# Patient Record
Sex: FEMALE | Race: WHITE | NOT HISPANIC OR LATINO | Employment: OTHER | URBAN - METROPOLITAN AREA
[De-identification: names, ages, dates, MRNs, and addresses within clinical notes are randomized per-mention and may not be internally consistent; named-entity substitution may affect disease eponyms.]

---

## 2019-04-08 ENCOUNTER — HOSPITAL ENCOUNTER (OUTPATIENT)
Dept: OTHER | Facility: HOSPITAL | Age: 79
Discharge: HOME | End: 2019-04-08
Attending: INTERNAL MEDICINE

## 2019-04-08 LAB
25(OH)D3 SERPL-MCNC: 31.2 NG/ML (ref 30–100)
ALBUMIN SERPL-MCNC: 4.5 G/DL (ref 3.5–5)
ALBUMIN/GLOB SERPL: 1.6 {RATIO} (ref 1.4–2.6)
ALP SERPL-CCNC: 60 U/L (ref 43–160)
ALT SERPL-CCNC: 14 U/L (ref 10–40)
ANION GAP SERPL CALC-SCNC: 19 MMOL/L (ref 8–19)
AST SERPL-CCNC: 23 U/L (ref 15–50)
BILIRUB SERPL-MCNC: 0.61 MG/DL (ref 0.2–1.3)
BUN SERPL-MCNC: 13 MG/DL (ref 5–25)
BUN/CREAT SERPL: 14 {RATIO} (ref 6–20)
CALCIUM SERPL-MCNC: 9.5 MG/DL (ref 8.7–10.4)
CHLORIDE SERPL-SCNC: 102 MMOL/L (ref 99–111)
CHOLEST SERPL-MCNC: 112 MG/DL (ref 107–200)
CHOLEST/HDLC SERPL: 3.1 {RATIO} (ref 3–6)
CONV CO2: 24 MMOL/L (ref 22–32)
CONV CREATININE URINE, RANDOM: 73.7 MG/DL (ref 10–300)
CONV MICROALBUM.,U,RANDOM: 108.5 MG/L (ref 0–20)
CONV TOTAL PROTEIN: 7.4 G/DL (ref 6.3–8.2)
CREAT UR-MCNC: 0.96 MG/DL (ref 0.5–0.9)
EST. AVERAGE GLUCOSE BLD GHB EST-MCNC: 169 MG/DL
GFR SERPLBLD BASED ON 1.73 SQ M-ARVRAT: 57 ML/MIN/{1.73_M2}
GLOBULIN UR ELPH-MCNC: 2.9 G/DL (ref 2–3.5)
GLUCOSE SERPL-MCNC: 143 MG/DL (ref 65–99)
HBA1C MFR BLD: 7.5 % (ref 3.5–5.7)
HDLC SERPL-MCNC: 36 MG/DL (ref 40–60)
LDLC SERPL CALC-MCNC: 29 MG/DL (ref 70–100)
MICROALBUMIN/CREAT UR: 147.2 MG/G{CRE} (ref 0–35)
OSMOLALITY SERPL CALC.SUM OF ELEC: 293 MOSM/KG (ref 273–304)
POTASSIUM SERPL-SCNC: 5 MMOL/L (ref 3.5–5.3)
SODIUM SERPL-SCNC: 140 MMOL/L (ref 135–147)
TRIGL SERPL-MCNC: 237 MG/DL (ref 40–150)
TSH SERPL-ACNC: 3.68 M[IU]/L (ref 0.27–4.2)
VLDLC SERPL-MCNC: 47 MG/DL (ref 5–37)

## 2019-10-03 ENCOUNTER — HOSPITAL ENCOUNTER (OUTPATIENT)
Dept: OTHER | Facility: HOSPITAL | Age: 79
Discharge: HOME | End: 2019-10-03
Attending: INTERNAL MEDICINE

## 2019-10-03 LAB
25(OH)D3 SERPL-MCNC: 27.7 NG/ML (ref 30–100)
ALBUMIN SERPL-MCNC: 4.4 G/DL (ref 3.5–5)
ALBUMIN/GLOB SERPL: 1.5 {RATIO} (ref 1.4–2.6)
ALP SERPL-CCNC: 57 U/L (ref 43–160)
ALT SERPL-CCNC: 12 U/L (ref 10–40)
ANION GAP SERPL CALC-SCNC: 17 MMOL/L (ref 8–19)
AST SERPL-CCNC: 15 U/L (ref 15–50)
BASOPHILS # BLD AUTO: 0.03 10*3/UL (ref 0–0.2)
BASOPHILS NFR BLD AUTO: 0.6 % (ref 0–3)
BILIRUB SERPL-MCNC: 0.53 MG/DL (ref 0.2–1.3)
BUN SERPL-MCNC: 16 MG/DL (ref 5–25)
BUN/CREAT SERPL: 15 {RATIO} (ref 6–20)
CALCIUM SERPL-MCNC: 9.4 MG/DL (ref 8.7–10.4)
CHLORIDE SERPL-SCNC: 105 MMOL/L (ref 99–111)
CHOLEST SERPL-MCNC: 112 MG/DL (ref 107–200)
CHOLEST/HDLC SERPL: 3.2 {RATIO} (ref 3–6)
CONV ABS IMM GRAN: 0.02 10*3/UL (ref 0–0.2)
CONV CO2: 24 MMOL/L (ref 22–32)
CONV CREATININE URINE, RANDOM: 49.6 MG/DL (ref 10–300)
CONV IMMATURE GRAN: 0.4 % (ref 0–1.8)
CONV MICROALBUM.,U,RANDOM: 51.3 MG/L (ref 0–20)
CONV TOTAL PROTEIN: 7.3 G/DL (ref 6.3–8.2)
CREAT UR-MCNC: 1.07 MG/DL (ref 0.5–0.9)
DEPRECATED RDW RBC AUTO: 45.4 FL (ref 36.4–46.3)
EOSINOPHIL # BLD AUTO: 0.32 10*3/UL (ref 0–0.7)
EOSINOPHIL # BLD AUTO: 6 % (ref 0–7)
ERYTHROCYTE [DISTWIDTH] IN BLOOD BY AUTOMATED COUNT: 14.1 % (ref 11.7–14.4)
EST. AVERAGE GLUCOSE BLD GHB EST-MCNC: 166 MG/DL
GFR SERPLBLD BASED ON 1.73 SQ M-ARVRAT: 50 ML/MIN/{1.73_M2}
GLOBULIN UR ELPH-MCNC: 2.9 G/DL (ref 2–3.5)
GLUCOSE SERPL-MCNC: 110 MG/DL (ref 65–99)
HBA1C MFR BLD: 7.4 % (ref 3.5–5.7)
HCT VFR BLD AUTO: 37.1 % (ref 37–47)
HDLC SERPL-MCNC: 35 MG/DL (ref 40–60)
HGB BLD-MCNC: 11.4 G/DL (ref 12–16)
IRON SATN MFR SERPL: 15 % (ref 20–55)
IRON SERPL-MCNC: 71 UG/DL (ref 60–170)
LDLC SERPL CALC-MCNC: 36 MG/DL (ref 70–100)
LYMPHOCYTES # BLD AUTO: 1.51 10*3/UL (ref 1–5)
LYMPHOCYTES NFR BLD AUTO: 28.2 % (ref 20–45)
MCH RBC QN AUTO: 27.2 PG (ref 27–31)
MCHC RBC AUTO-ENTMCNC: 30.7 G/DL (ref 33–37)
MCV RBC AUTO: 88.5 FL (ref 81–99)
MICROALBUMIN/CREAT UR: 103.4 MG/G{CRE} (ref 0–35)
MONOCYTES # BLD AUTO: 0.45 10*3/UL (ref 0.2–1.2)
MONOCYTES NFR BLD AUTO: 8.4 % (ref 3–10)
NEUTROPHILS # BLD AUTO: 3.02 10*3/UL (ref 2–8)
NEUTROPHILS NFR BLD AUTO: 56.4 % (ref 30–85)
NRBC CBCN: 0 % (ref 0–0.7)
OSMOLALITY SERPL CALC.SUM OF ELEC: 294 MOSM/KG (ref 273–304)
PLATELET # BLD AUTO: 237 10*3/UL (ref 130–400)
PMV BLD AUTO: 10.7 FL (ref 9.4–12.3)
POTASSIUM SERPL-SCNC: 4.8 MMOL/L (ref 3.5–5.3)
RBC # BLD AUTO: 4.19 10*6/UL (ref 4.2–5.4)
SODIUM SERPL-SCNC: 141 MMOL/L (ref 135–147)
TIBC SERPL-MCNC: 468 UG/DL (ref 245–450)
TRANSFERRIN SERPL-MCNC: 327 MG/DL (ref 250–380)
TRIGL SERPL-MCNC: 207 MG/DL (ref 40–150)
VIT B12 SERPL-MCNC: 848 PG/ML (ref 211–911)
VLDLC SERPL-MCNC: 41 MG/DL (ref 5–37)
WBC # BLD AUTO: 5.35 10*3/UL (ref 4.8–10.8)

## 2020-04-17 ENCOUNTER — HOSPITAL ENCOUNTER (OUTPATIENT)
Dept: LAB | Facility: HOSPITAL | Age: 80
Discharge: HOME | End: 2020-04-17
Attending: INTERNAL MEDICINE

## 2020-04-17 LAB
ALBUMIN SERPL-MCNC: 4.4 G/DL (ref 3.5–5)
ALBUMIN/GLOB SERPL: 1.5 {RATIO} (ref 1.4–2.6)
ALP SERPL-CCNC: 62 U/L (ref 43–160)
ALT SERPL-CCNC: 16 U/L (ref 10–40)
ANION GAP SERPL CALC-SCNC: 18 MMOL/L (ref 8–19)
AST SERPL-CCNC: 18 U/L (ref 15–50)
BASOPHILS # BLD AUTO: 0.04 10*3/UL (ref 0–0.2)
BASOPHILS NFR BLD AUTO: 0.7 % (ref 0–3)
BILIRUB SERPL-MCNC: 0.47 MG/DL (ref 0.2–1.3)
BUN SERPL-MCNC: 11 MG/DL (ref 5–25)
BUN/CREAT SERPL: 13 {RATIO} (ref 6–20)
CALCIUM SERPL-MCNC: 9.5 MG/DL (ref 8.7–10.4)
CHLORIDE SERPL-SCNC: 103 MMOL/L (ref 99–111)
CHOLEST SERPL-MCNC: 109 MG/DL (ref 107–200)
CHOLEST/HDLC SERPL: 3 {RATIO} (ref 3–6)
CONV ABS IMM GRAN: 0.01 10*3/UL (ref 0–0.2)
CONV CO2: 25 MMOL/L (ref 22–32)
CONV IMMATURE GRAN: 0.2 % (ref 0–1.8)
CONV TOTAL PROTEIN: 7.4 G/DL (ref 6.3–8.2)
CREAT UR-MCNC: 0.88 MG/DL (ref 0.5–0.9)
DEPRECATED RDW RBC AUTO: 46.4 FL (ref 36.4–46.3)
EOSINOPHIL # BLD AUTO: 0.35 10*3/UL (ref 0–0.7)
EOSINOPHIL # BLD AUTO: 6.6 % (ref 0–7)
ERYTHROCYTE [DISTWIDTH] IN BLOOD BY AUTOMATED COUNT: 14.2 % (ref 11.7–14.4)
EST. AVERAGE GLUCOSE BLD GHB EST-MCNC: 169 MG/DL
FERRITIN SERPL-MCNC: 20 NG/ML (ref 10–200)
GFR SERPLBLD BASED ON 1.73 SQ M-ARVRAT: >60 ML/MIN/{1.73_M2}
GLOBULIN UR ELPH-MCNC: 3 G/DL (ref 2–3.5)
GLUCOSE SERPL-MCNC: 136 MG/DL (ref 65–99)
HBA1C MFR BLD: 7.5 % (ref 3.5–5.7)
HCT VFR BLD AUTO: 38 % (ref 37–47)
HDLC SERPL-MCNC: 36 MG/DL (ref 40–60)
HGB BLD-MCNC: 11.8 G/DL (ref 12–16)
IRON SATN MFR SERPL: 13 % (ref 20–55)
IRON SERPL-MCNC: 58 UG/DL (ref 60–170)
LDLC SERPL CALC-MCNC: 34 MG/DL (ref 70–100)
LYMPHOCYTES # BLD AUTO: 1.59 10*3/UL (ref 1–5)
LYMPHOCYTES NFR BLD AUTO: 29.8 % (ref 20–45)
MCH RBC QN AUTO: 27.9 PG (ref 27–31)
MCHC RBC AUTO-ENTMCNC: 31.1 G/DL (ref 33–37)
MCV RBC AUTO: 89.8 FL (ref 81–99)
MONOCYTES # BLD AUTO: 0.49 10*3/UL (ref 0.2–1.2)
MONOCYTES NFR BLD AUTO: 9.2 % (ref 3–10)
NEUTROPHILS # BLD AUTO: 2.86 10*3/UL (ref 2–8)
NEUTROPHILS NFR BLD AUTO: 53.5 % (ref 30–85)
NRBC CBCN: 0 % (ref 0–0.7)
OSMOLALITY SERPL CALC.SUM OF ELEC: 293 MOSM/KG (ref 273–304)
PLATELET # BLD AUTO: 293 10*3/UL (ref 130–400)
PMV BLD AUTO: 10.9 FL (ref 9.4–12.3)
POTASSIUM SERPL-SCNC: 4.9 MMOL/L (ref 3.5–5.3)
RBC # BLD AUTO: 4.23 10*6/UL (ref 4.2–5.4)
SODIUM SERPL-SCNC: 141 MMOL/L (ref 135–147)
TIBC SERPL-MCNC: 459 UG/DL (ref 245–450)
TRANSFERRIN SERPL-MCNC: 321 MG/DL (ref 250–380)
TRIGL SERPL-MCNC: 197 MG/DL (ref 40–150)
VLDLC SERPL-MCNC: 39 MG/DL (ref 5–37)
WBC # BLD AUTO: 5.34 10*3/UL (ref 4.8–10.8)

## 2020-05-22 ENCOUNTER — HOSPITAL ENCOUNTER (OUTPATIENT)
Dept: GENERAL RADIOLOGY | Facility: HOSPITAL | Age: 80
Discharge: HOME | End: 2020-05-22
Attending: INTERNAL MEDICINE

## 2020-06-01 ENCOUNTER — OFFICE VISIT CONVERTED (OUTPATIENT)
Dept: ORTHOPEDIC SURGERY | Facility: CLINIC | Age: 80
End: 2020-06-01
Attending: ORTHOPAEDIC SURGERY

## 2020-06-01 ENCOUNTER — CONVERSION ENCOUNTER (OUTPATIENT)
Dept: ORTHOPEDIC SURGERY | Facility: CLINIC | Age: 80
End: 2020-06-01

## 2020-07-15 ENCOUNTER — HOSPITAL ENCOUNTER (OUTPATIENT)
Dept: LAB | Facility: HOSPITAL | Age: 80
Discharge: HOME | End: 2020-07-15
Attending: INTERNAL MEDICINE

## 2020-07-15 LAB
25(OH)D3 SERPL-MCNC: 36.2 NG/ML (ref 30–100)
ALBUMIN SERPL-MCNC: 4.5 G/DL (ref 3.5–5)
ALBUMIN/GLOB SERPL: 1.5 {RATIO} (ref 1.4–2.6)
ALP SERPL-CCNC: 62 U/L (ref 43–160)
ALT SERPL-CCNC: 20 U/L (ref 10–40)
ANION GAP SERPL CALC-SCNC: 21 MMOL/L (ref 8–19)
AST SERPL-CCNC: 23 U/L (ref 15–50)
BASOPHILS # BLD AUTO: 0.03 10*3/UL (ref 0–0.2)
BASOPHILS NFR BLD AUTO: 0.5 % (ref 0–3)
BILIRUB SERPL-MCNC: 0.64 MG/DL (ref 0.2–1.3)
BUN SERPL-MCNC: 11 MG/DL (ref 5–25)
BUN/CREAT SERPL: 10 {RATIO} (ref 6–20)
CALCIUM SERPL-MCNC: 9.7 MG/DL (ref 8.7–10.4)
CHLORIDE SERPL-SCNC: 104 MMOL/L (ref 99–111)
CHOLEST SERPL-MCNC: 123 MG/DL (ref 107–200)
CHOLEST/HDLC SERPL: 3.1 {RATIO} (ref 3–6)
CONV ABS IMM GRAN: 0.02 10*3/UL (ref 0–0.2)
CONV CO2: 22 MMOL/L (ref 22–32)
CONV CREATININE URINE, RANDOM: 57.6 MG/DL (ref 10–300)
CONV IMMATURE GRAN: 0.3 % (ref 0–1.8)
CONV MICROALBUM.,U,RANDOM: 67.6 MG/L (ref 0–20)
CONV TOTAL PROTEIN: 7.5 G/DL (ref 6.3–8.2)
CREAT UR-MCNC: 1.11 MG/DL (ref 0.5–0.9)
DEPRECATED RDW RBC AUTO: 48.6 FL (ref 36.4–46.3)
EOSINOPHIL # BLD AUTO: 0.41 10*3/UL (ref 0–0.7)
EOSINOPHIL # BLD AUTO: 7 % (ref 0–7)
ERYTHROCYTE [DISTWIDTH] IN BLOOD BY AUTOMATED COUNT: 14.5 % (ref 11.7–14.4)
EST. AVERAGE GLUCOSE BLD GHB EST-MCNC: 174 MG/DL
GFR SERPLBLD BASED ON 1.73 SQ M-ARVRAT: 47 ML/MIN/{1.73_M2}
GLOBULIN UR ELPH-MCNC: 3 G/DL (ref 2–3.5)
GLUCOSE SERPL-MCNC: 137 MG/DL (ref 65–99)
HBA1C MFR BLD: 7.7 % (ref 3.5–5.7)
HCT VFR BLD AUTO: 36.7 % (ref 37–47)
HDLC SERPL-MCNC: 40 MG/DL (ref 40–60)
HGB BLD-MCNC: 11.3 G/DL (ref 12–16)
IRON SERPL-MCNC: 74 UG/DL (ref 60–170)
LDLC SERPL CALC-MCNC: 24 MG/DL (ref 70–100)
LYMPHOCYTES # BLD AUTO: 1.5 10*3/UL (ref 1–5)
LYMPHOCYTES NFR BLD AUTO: 25.6 % (ref 20–45)
MCH RBC QN AUTO: 28 PG (ref 27–31)
MCHC RBC AUTO-ENTMCNC: 30.8 G/DL (ref 33–37)
MCV RBC AUTO: 90.8 FL (ref 81–99)
MICROALBUMIN/CREAT UR: 117.4 MG/G{CRE} (ref 0–35)
MONOCYTES # BLD AUTO: 0.44 10*3/UL (ref 0.2–1.2)
MONOCYTES NFR BLD AUTO: 7.5 % (ref 3–10)
NEUTROPHILS # BLD AUTO: 3.46 10*3/UL (ref 2–8)
NEUTROPHILS NFR BLD AUTO: 59.1 % (ref 30–85)
NRBC CBCN: 0 % (ref 0–0.7)
OSMOLALITY SERPL CALC.SUM OF ELEC: 296 MOSM/KG (ref 273–304)
PLATELET # BLD AUTO: 243 10*3/UL (ref 130–400)
PMV BLD AUTO: 11.5 FL (ref 9.4–12.3)
POTASSIUM SERPL-SCNC: 4.8 MMOL/L (ref 3.5–5.3)
RBC # BLD AUTO: 4.04 10*6/UL (ref 4.2–5.4)
SODIUM SERPL-SCNC: 142 MMOL/L (ref 135–147)
TRIGL SERPL-MCNC: 295 MG/DL (ref 40–150)
TSH SERPL-ACNC: 4.03 M[IU]/L (ref 0.27–4.2)
VIT B12 SERPL-MCNC: 986 PG/ML (ref 211–911)
VLDLC SERPL-MCNC: 59 MG/DL (ref 5–37)
WBC # BLD AUTO: 5.86 10*3/UL (ref 4.8–10.8)

## 2020-07-23 ENCOUNTER — HOSPITAL ENCOUNTER (OUTPATIENT)
Dept: GENERAL RADIOLOGY | Facility: HOSPITAL | Age: 80
Discharge: HOME | End: 2020-07-23
Attending: INTERNAL MEDICINE

## 2020-10-12 ENCOUNTER — HOSPITAL ENCOUNTER (OUTPATIENT)
Dept: LAB | Facility: HOSPITAL | Age: 80
Discharge: HOME | End: 2020-10-12
Attending: INTERNAL MEDICINE

## 2020-10-12 LAB
25(OH)D3 SERPL-MCNC: 29.8 NG/ML (ref 30–100)
ALBUMIN SERPL-MCNC: 4.3 G/DL (ref 3.5–5)
ALBUMIN/GLOB SERPL: 1.5 {RATIO} (ref 1.4–2.6)
ALP SERPL-CCNC: 62 U/L (ref 43–160)
ALT SERPL-CCNC: 14 U/L (ref 10–40)
ANION GAP SERPL CALC-SCNC: 21 MMOL/L (ref 8–19)
AST SERPL-CCNC: 22 U/L (ref 15–50)
BASOPHILS # BLD AUTO: 0.02 10*3/UL (ref 0–0.2)
BASOPHILS NFR BLD AUTO: 0.4 % (ref 0–3)
BILIRUB SERPL-MCNC: 0.61 MG/DL (ref 0.2–1.3)
BUN SERPL-MCNC: 15 MG/DL (ref 5–25)
BUN/CREAT SERPL: 12 {RATIO} (ref 6–20)
CALCIUM SERPL-MCNC: 10.4 MG/DL (ref 8.7–10.4)
CHLORIDE SERPL-SCNC: 104 MMOL/L (ref 99–111)
CHOLEST SERPL-MCNC: 146 MG/DL (ref 107–200)
CHOLEST/HDLC SERPL: 4.1 {RATIO} (ref 3–6)
CONV ABS IMM GRAN: 0.01 10*3/UL (ref 0–0.2)
CONV CO2: 21 MMOL/L (ref 22–32)
CONV CREATININE URINE, RANDOM: 22.9 MG/DL (ref 10–300)
CONV IMMATURE GRAN: 0.2 % (ref 0–1.8)
CONV MICROALBUM.,U,RANDOM: 41.6 MG/L (ref 0–20)
CONV TOTAL PROTEIN: 7.2 G/DL (ref 6.3–8.2)
CREAT UR-MCNC: 1.27 MG/DL (ref 0.5–0.9)
DEPRECATED RDW RBC AUTO: 44 FL (ref 36.4–46.3)
EOSINOPHIL # BLD AUTO: 0.39 10*3/UL (ref 0–0.7)
EOSINOPHIL # BLD AUTO: 8 % (ref 0–7)
ERYTHROCYTE [DISTWIDTH] IN BLOOD BY AUTOMATED COUNT: 13.4 % (ref 11.7–14.4)
EST. AVERAGE GLUCOSE BLD GHB EST-MCNC: 163 MG/DL
FERRITIN SERPL-MCNC: 27 NG/ML (ref 10–200)
FOLATE SERPL-MCNC: >20 NG/ML (ref 4.8–20)
GFR SERPLBLD BASED ON 1.73 SQ M-ARVRAT: 40 ML/MIN/{1.73_M2}
GLOBULIN UR ELPH-MCNC: 2.9 G/DL (ref 2–3.5)
GLUCOSE SERPL-MCNC: 112 MG/DL (ref 65–99)
HBA1C MFR BLD: 7.3 % (ref 3.5–5.7)
HCT VFR BLD AUTO: 37 % (ref 37–47)
HDLC SERPL-MCNC: 36 MG/DL (ref 40–60)
HGB BLD-MCNC: 11.4 G/DL (ref 12–16)
IRON SATN MFR SERPL: 14 % (ref 20–55)
IRON SERPL-MCNC: 64 UG/DL (ref 60–170)
LDLC SERPL CALC-MCNC: 60 MG/DL (ref 70–100)
LYMPHOCYTES # BLD AUTO: 1.38 10*3/UL (ref 1–5)
LYMPHOCYTES NFR BLD AUTO: 28.5 % (ref 20–45)
MCH RBC QN AUTO: 27.7 PG (ref 27–31)
MCHC RBC AUTO-ENTMCNC: 30.8 G/DL (ref 33–37)
MCV RBC AUTO: 90 FL (ref 81–99)
MICROALBUMIN/CREAT UR: 181.7 MG/G{CRE} (ref 0–35)
MONOCYTES # BLD AUTO: 0.42 10*3/UL (ref 0.2–1.2)
MONOCYTES NFR BLD AUTO: 8.7 % (ref 3–10)
NEUTROPHILS # BLD AUTO: 2.63 10*3/UL (ref 2–8)
NEUTROPHILS NFR BLD AUTO: 54.2 % (ref 30–85)
NRBC CBCN: 0 % (ref 0–0.7)
OSMOLALITY SERPL CALC.SUM OF ELEC: 294 MOSM/KG (ref 273–304)
PLATELET # BLD AUTO: 242 10*3/UL (ref 130–400)
PMV BLD AUTO: 10.9 FL (ref 9.4–12.3)
POTASSIUM SERPL-SCNC: 5.1 MMOL/L (ref 3.5–5.3)
RBC # BLD AUTO: 4.11 10*6/UL (ref 4.2–5.4)
SODIUM SERPL-SCNC: 141 MMOL/L (ref 135–147)
T4 FREE SERPL-MCNC: 1.1 NG/DL (ref 0.9–1.8)
TIBC SERPL-MCNC: 462 UG/DL (ref 245–450)
TRANSFERRIN SERPL-MCNC: 323 MG/DL (ref 250–380)
TRIGL SERPL-MCNC: 252 MG/DL (ref 40–150)
TSH SERPL-ACNC: 3.75 M[IU]/L (ref 0.27–4.2)
VIT B12 SERPL-MCNC: 242 PG/ML (ref 211–911)
VLDLC SERPL-MCNC: 50 MG/DL (ref 5–37)
WBC # BLD AUTO: 4.85 10*3/UL (ref 4.8–10.8)

## 2020-10-13 LAB
ALBUMIN SERPL-MCNC: 3.5 G/DL (ref 2.9–4.4)
ALBUMIN/GLOB SERPL: 1 {RATIO} (ref 0.7–1.7)
ALPHA2 GLOB SERPL ELPH-MCNC: 1.2 G/DL (ref 0.4–1)
BETA GLOBULIN: 1.1 G/DL (ref 0.7–1.3)
C PEPTIDE SERPL-MCNC: 5.3 NG/ML (ref 1.1–4.4)
CONV ALPHA-1-GLOBULIN: 0.2 G/DL (ref 0–0.4)
CONV PE INTERPRETATION: ABNORMAL
CONV PE NOTE: ABNORMAL
CONV TOTAL PROTEIN: 6.9 G/DL (ref 6–8.5)
GAMMA GLOB SERPL ELPH-MCNC: 0.9 G/DL (ref 0.4–1.8)
GLOBULIN UR ELPH-MCNC: 3.4 G/DL (ref 2.2–3.9)
M-SPIKE: ABNORMAL G/DL

## 2020-10-15 LAB
ALBUMIN 24H MFR UR ELPH: 28.2 %
ALPHA1 GLOB 24H MFR UR ELPH: 11.2 %
ALPHA2 GLOB 24H MFR UR ELPH: 25.6 %
B-GLOBULIN MFR UR ELPH: 24.1 %
CONV IMMUNOFIXATION (URINE): NORMAL
CONV PE NOTE: NORMAL
GAMMA GLOB 24H MFR UR ELPH: 10.9 %
M PROTEIN MFR UR ELPH: NORMAL %
PROT UR-MCNC: 13.6 MG/DL

## 2020-10-27 ENCOUNTER — HOSPITAL ENCOUNTER (OUTPATIENT)
Dept: LAB | Facility: HOSPITAL | Age: 80
Discharge: HOME | End: 2020-10-27
Attending: INTERNAL MEDICINE

## 2020-10-27 LAB
APPEARANCE UR: CLEAR
BILIRUB UR QL: NEGATIVE
COLOR UR: YELLOW
CONV BACTERIA: NEGATIVE
CONV COLLECTION SOURCE (UA): ABNORMAL
CONV HYALINE CASTS IN URINE MICRO: ABNORMAL /[LPF]
CONV UROBILINOGEN IN URINE BY AUTOMATED TEST STRIP: 0.2 {EHRLICHU}/DL (ref 0.1–1)
GLUCOSE UR QL: >=1000 MG/DL
HGB UR QL STRIP: ABNORMAL
KETONES UR QL STRIP: NEGATIVE MG/DL
LEUKOCYTE ESTERASE UR QL STRIP: NEGATIVE
NITRITE UR QL STRIP: NEGATIVE
PH UR STRIP.AUTO: 5.5 [PH] (ref 5–8)
PROT UR QL: 100 MG/DL
RBC #/AREA URNS HPF: ABNORMAL /[HPF]
SP GR UR: 1.02 (ref 1–1.03)
WBC #/AREA URNS HPF: ABNORMAL /[HPF]

## 2020-11-10 ENCOUNTER — HOSPITAL ENCOUNTER (OUTPATIENT)
Dept: MAMMOGRAPHY | Facility: HOSPITAL | Age: 80
Discharge: HOME | End: 2020-11-10
Attending: INTERNAL MEDICINE

## 2021-04-22 ENCOUNTER — HOSPITAL ENCOUNTER (OUTPATIENT)
Dept: LAB | Facility: HOSPITAL | Age: 81
Discharge: HOME | End: 2021-04-22
Attending: INTERNAL MEDICINE

## 2021-04-22 LAB
ALBUMIN SERPL-MCNC: 4 G/DL (ref 3.5–5)
ALBUMIN/GLOB SERPL: 1.3 {RATIO} (ref 1.4–2.6)
ALP SERPL-CCNC: 58 U/L (ref 43–160)
ALT SERPL-CCNC: 14 U/L (ref 10–40)
ANION GAP SERPL CALC-SCNC: 14 MMOL/L (ref 8–19)
AST SERPL-CCNC: 19 U/L (ref 15–50)
BASOPHILS # BLD AUTO: 0.03 10*3/UL (ref 0–0.2)
BASOPHILS NFR BLD AUTO: 0.6 % (ref 0–3)
BILIRUB SERPL-MCNC: 0.49 MG/DL (ref 0.2–1.3)
BUN SERPL-MCNC: 10 MG/DL (ref 5–25)
BUN/CREAT SERPL: 9 {RATIO} (ref 6–20)
CALCIUM SERPL-MCNC: 9.1 MG/DL (ref 8.7–10.4)
CHLORIDE SERPL-SCNC: 104 MMOL/L (ref 99–111)
CHOLEST SERPL-MCNC: 132 MG/DL (ref 107–200)
CHOLEST/HDLC SERPL: 3.2 {RATIO} (ref 3–6)
CONV ABS IMM GRAN: 0.01 10*3/UL (ref 0–0.2)
CONV CO2: 26 MMOL/L (ref 22–32)
CONV IMMATURE GRAN: 0.2 % (ref 0–1.8)
CONV TOTAL PROTEIN: 7.2 G/DL (ref 6.3–8.2)
CREAT UR-MCNC: 1.06 MG/DL (ref 0.5–0.9)
DEPRECATED RDW RBC AUTO: 45.7 FL (ref 36.4–46.3)
EOSINOPHIL # BLD AUTO: 0.26 10*3/UL (ref 0–0.7)
EOSINOPHIL # BLD AUTO: 5.4 % (ref 0–7)
ERYTHROCYTE [DISTWIDTH] IN BLOOD BY AUTOMATED COUNT: 14 % (ref 11.7–14.4)
EST. AVERAGE GLUCOSE BLD GHB EST-MCNC: 163 MG/DL
FERRITIN SERPL-MCNC: 29 NG/ML (ref 10–200)
FOLATE SERPL-MCNC: 18.2 NG/ML (ref 4.8–20)
GFR SERPLBLD BASED ON 1.73 SQ M-ARVRAT: 50 ML/MIN/{1.73_M2}
GLOBULIN UR ELPH-MCNC: 3.2 G/DL (ref 2–3.5)
GLUCOSE SERPL-MCNC: 147 MG/DL (ref 65–99)
HBA1C MFR BLD: 7.3 % (ref 3.5–5.7)
HCT VFR BLD AUTO: 35.6 % (ref 37–47)
HDLC SERPL-MCNC: 41 MG/DL (ref 40–60)
HGB BLD-MCNC: 11.3 G/DL (ref 12–16)
IRON SATN MFR SERPL: 16 % (ref 20–55)
IRON SERPL-MCNC: 69 UG/DL (ref 60–170)
LDLC SERPL CALC-MCNC: 54 MG/DL (ref 70–100)
LYMPHOCYTES # BLD AUTO: 1.4 10*3/UL (ref 1–5)
LYMPHOCYTES NFR BLD AUTO: 29.3 % (ref 20–45)
MCH RBC QN AUTO: 28.3 PG (ref 27–31)
MCHC RBC AUTO-ENTMCNC: 31.7 G/DL (ref 33–37)
MCV RBC AUTO: 89.2 FL (ref 81–99)
MONOCYTES # BLD AUTO: 0.42 10*3/UL (ref 0.2–1.2)
MONOCYTES NFR BLD AUTO: 8.8 % (ref 3–10)
NEUTROPHILS # BLD AUTO: 2.66 10*3/UL (ref 2–8)
NEUTROPHILS NFR BLD AUTO: 55.7 % (ref 30–85)
NRBC CBCN: 0 % (ref 0–0.7)
OSMOLALITY SERPL CALC.SUM OF ELEC: 290 MOSM/KG (ref 273–304)
PLATELET # BLD AUTO: 234 10*3/UL (ref 130–400)
PMV BLD AUTO: 11.2 FL (ref 9.4–12.3)
POTASSIUM SERPL-SCNC: 4.8 MMOL/L (ref 3.5–5.3)
RBC # BLD AUTO: 3.99 10*6/UL (ref 4.2–5.4)
SODIUM SERPL-SCNC: 139 MMOL/L (ref 135–147)
TIBC SERPL-MCNC: 425 UG/DL (ref 245–450)
TRANSFERRIN SERPL-MCNC: 297 MG/DL (ref 250–380)
TRIGL SERPL-MCNC: 187 MG/DL (ref 40–150)
VIT B12 SERPL-MCNC: 250 PG/ML (ref 211–911)
VLDLC SERPL-MCNC: 37 MG/DL (ref 5–37)
WBC # BLD AUTO: 4.78 10*3/UL (ref 4.8–10.8)

## 2021-05-10 NOTE — H&P
"   History and Physical      Patient Name: Lucía Thomas   Patient ID: [id number]   Sex: Female   YOB: 1940    Referring Provider: Chuck Epps MD    Visit Date: June 1, 2020    Provider: Chuck Epps MD   Location: [facility]   Location Address: [address] [place] [address]  [id number]   Location Phone: [phone]          Chief Complaint  · Right knee pain      History Of Present Illness  Lucía Thomas is a 79 year old /White female who presents today to [facility]. She is here for right knee pain that has been going on for the last 4-6 months. She had a miss step when she was on vacation and had increasing pain and swelling. Since then she has had a lot of swelling and pain in her right knee.       Past Medical History  Diabetes         Past Surgical History  heart surgery         Allergy List  NO KNOWN DRUG ALLERGIES; NO KNOWN DRUG ALLERGIES         Family Medical History  Heart Disease; Cancer, Unspecified         Social History  Alcohol Use (Never); Claustophobic (Unknown); lives with spouse; .; Recreational Drug Use (Never); Retired.; Tobacco (Never)         Review of Systems  · Constitutional  o Denies  o : fever, chills, weight loss  · Cardiovascular  o Denies  o : chest pain, shortness of breath  · Gastrointestinal  o Denies  o : liver disease, heartburn, nausea, blood in stools  · Genitourinary  o Denies  o : painful urination, blood in urine  · Integument  o Denies  o : rash, itching  · Neurologic  o Denies  o : headache, weakness, loss of consciousness  · Musculoskeletal  o Admits  o : painful, swollen joints  · Psychiatric  o Denies  o : drug/alcohol addiction, anxiety, depression      Vitals  Date Time BP Position Site L\R Cuff Size HR RR TEMP (F) WT  HT  BMI kg/m2 BSA m2 O2 Sat        06/01/2020 09:07 AM         135lbs 0oz 5'  2\" 24.69 1.64           Physical Examination  · Constitutional  o Appearance  o : well developed, well-nourished, no " obvious deformities present  · Head and Face  o Head  o :   § Inspection  § : normocephalic  o Face  o :   § Inspection  § : no facial lesions  · Eyes  o Conjunctivae  o : conjunctivae normal  o Sclerae  o : sclerae white  · Ears, Nose, Mouth and Throat  o Ears  o :   § External Ears  § : appearance within normal limits  § Hearing  § : intact  o Nose  o :   § External Nose  § : appearance normal  · Neck  o Inspection/Palpation  o : normal appearance  o Range of Motion  o : full range of motion  · Respiratory  o Respiratory Effort  o : breathing unlabored  o Inspection of Chest  o : normal appearance  o Auscultation of Lungs  o : no audible wheezing or rales  · Cardiovascular  o Heart  o : regular rate  · Gastrointestinal  o Abdominal Examination  o : soft and non-tender  · Skin and Subcutaneous Tissue  o General Inspection  o : intact, no rashes  · Psychiatric  o General  o : Alert and oriented x3  o Judgement and Insight  o : judgment and insight intact  o Mood and Affect  o : mood normal, affect appropriate  · Right Knee  o Inspection  o : She has large knee effusion. She has tenderness over the medial joint line. Positive mild patellofemoral crepitus. Neurovascularly intact. Sensation grossly intact. X-rays reveal mild to moderate osteoarthritis in medial compartment.   · Injection Note/Aspiration Note  o Site  o : Right knee  o Procedure  o : Procedure: After educating the patient, patient gave consent for procedure. After using Chloraprep, the joint space was injected. The patient tolerated the procedure well.  o Medication  o : Right knee aspirated prior to injecting 80 mg of DepoMedrol with 9cc of 1% Lidocaine  · In Office Procedures  o View  o : LAT/SUNRISE/STANDING  o Site  o : right, knee  o Indication  o : right knee pain  o Study  o : X-rays ordered, taken in the office, and reviewed today.  o Xray  o : X-rays reveal mild to moderate osteoarthritis in medial compartment.   o Comparative Data  o : No  comparative data found  · Imaging  o Imaging  o : MRI right knee: Tear of the body and posterior horn of the medial meniscus.           Assessment  · Primary osteoarthritis of right knee     715.16/M17.11  · Right knee MMT (medial meniscus tear)     836.0/S83.249A  · Right knee pain, unspecified chronicity     719.46/M25.561      Plan  · Orders  o Depo-Medrol injection 80mg () - - 06/01/2020   Lot [id number] Exp 05 2021 [employer] Administered by THIERNO JAMES MD  o Knee Intra-articular Injection without US Guidance [facility] ([id number]) - - 06/01/2020   Lot [id number] Exp 07 01 2021 [employer] Administered by THIERNO JAMES MD  o Knee (Right) [facility]erred View ([id number]) - 719.46/M25.561 - 06/01/2020  · Medications  o Medications have been Reconciled  o Transition of Care or Provider Policy  · Instructions  o Reviewed the patient's Past Medical, Social, and Family history as well as the ROS at today's visit, no changes.  o Call or return if worsening symptoms.  o This note is transcribed by Maren orourke  o We discussed possible arthroscopy, possible knee replacement, aspiration and injection. We discussed risks and benefits of them all and she wants to try an aspiration and injection. Follow back up in 4 weeks.             Electronically Signed by: Maren Carcamo, -Author on June 4, 2020 11:25:24 AM  Electronically Co-signed by: Thierno James MD -Reviewer on June 8, 2020 09:33:31 AM

## 2021-05-15 VITALS — WEIGHT: 135 LBS | BODY MASS INDEX: 24.84 KG/M2 | HEIGHT: 62 IN

## 2021-09-07 ENCOUNTER — LAB (OUTPATIENT)
Dept: LAB | Facility: HOSPITAL | Age: 81
End: 2021-09-07

## 2021-09-07 ENCOUNTER — TRANSCRIBE ORDERS (OUTPATIENT)
Dept: LAB | Facility: HOSPITAL | Age: 81
End: 2021-09-07

## 2021-09-07 DIAGNOSIS — I10 ESSENTIAL HYPERTENSION: Primary | ICD-10-CM

## 2021-09-07 DIAGNOSIS — D51.1 VIT B12 DEFIC ANEMIA D/T SLCTV VIT B12 MALABSORP W PROTEIN: ICD-10-CM

## 2021-09-07 DIAGNOSIS — E61.1 IRON DEFICIENCY: ICD-10-CM

## 2021-09-07 DIAGNOSIS — I10 ESSENTIAL HYPERTENSION: ICD-10-CM

## 2021-09-07 DIAGNOSIS — E55.9 VITAMIN D DEFICIENCY: ICD-10-CM

## 2021-09-07 DIAGNOSIS — E78.5 HYPERLIPIDEMIA, UNSPECIFIED HYPERLIPIDEMIA TYPE: ICD-10-CM

## 2021-09-07 DIAGNOSIS — E11.9 DIABETES MELLITUS WITHOUT COMPLICATION (HCC): ICD-10-CM

## 2021-09-07 LAB
25(OH)D3 SERPL-MCNC: 46.5 NG/ML
ALBUMIN SERPL-MCNC: 4.4 G/DL (ref 3.5–5.2)
ALBUMIN/GLOB SERPL: 1.8 G/DL
ALP SERPL-CCNC: 50 U/L (ref 39–117)
ALT SERPL W P-5'-P-CCNC: 14 U/L (ref 1–33)
ANION GAP SERPL CALCULATED.3IONS-SCNC: 12.5 MMOL/L (ref 5–15)
AST SERPL-CCNC: 20 U/L (ref 1–32)
BASOPHILS # BLD AUTO: 0.05 10*3/MM3 (ref 0–0.2)
BASOPHILS NFR BLD AUTO: 0.9 % (ref 0–1.5)
BILIRUB SERPL-MCNC: 0.4 MG/DL (ref 0–1.2)
BUN SERPL-MCNC: 14 MG/DL (ref 8–23)
BUN/CREAT SERPL: 11.9 (ref 7–25)
CALCIUM SPEC-SCNC: 9.4 MG/DL (ref 8.6–10.5)
CHLORIDE SERPL-SCNC: 103 MMOL/L (ref 98–107)
CO2 SERPL-SCNC: 23.5 MMOL/L (ref 22–29)
CREAT SERPL-MCNC: 1.18 MG/DL (ref 0.57–1)
DEPRECATED RDW RBC AUTO: 43.5 FL (ref 37–54)
EOSINOPHIL # BLD AUTO: 0.42 10*3/MM3 (ref 0–0.4)
EOSINOPHIL NFR BLD AUTO: 7.8 % (ref 0.3–6.2)
ERYTHROCYTE [DISTWIDTH] IN BLOOD BY AUTOMATED COUNT: 13.1 % (ref 12.3–15.4)
FERRITIN SERPL-MCNC: 55.3 NG/ML (ref 13–150)
FOLATE SERPL-MCNC: 17.3 NG/ML (ref 4.78–24.2)
GFR SERPL CREATININE-BSD FRML MDRD: 44 ML/MIN/1.73
GLOBULIN UR ELPH-MCNC: 2.4 GM/DL
GLUCOSE SERPL-MCNC: 94 MG/DL (ref 65–99)
HBA1C MFR BLD: 7.4 % (ref 4.8–5.6)
HCT VFR BLD AUTO: 36.3 % (ref 34–46.6)
HGB BLD-MCNC: 11.4 G/DL (ref 12–15.9)
IMM GRANULOCYTES # BLD AUTO: 0.01 10*3/MM3 (ref 0–0.05)
IMM GRANULOCYTES NFR BLD AUTO: 0.2 % (ref 0–0.5)
IRON 24H UR-MRATE: 77 MCG/DL (ref 37–145)
IRON SATN MFR SERPL: 18 % (ref 20–50)
LYMPHOCYTES # BLD AUTO: 1.63 10*3/MM3 (ref 0.7–3.1)
LYMPHOCYTES NFR BLD AUTO: 30.4 % (ref 19.6–45.3)
MCH RBC QN AUTO: 28.5 PG (ref 26.6–33)
MCHC RBC AUTO-ENTMCNC: 31.4 G/DL (ref 31.5–35.7)
MCV RBC AUTO: 90.8 FL (ref 79–97)
MONOCYTES # BLD AUTO: 0.37 10*3/MM3 (ref 0.1–0.9)
MONOCYTES NFR BLD AUTO: 6.9 % (ref 5–12)
NEUTROPHILS NFR BLD AUTO: 2.88 10*3/MM3 (ref 1.7–7)
NEUTROPHILS NFR BLD AUTO: 53.8 % (ref 42.7–76)
NRBC BLD AUTO-RTO: 0.2 /100 WBC (ref 0–0.2)
PLATELET # BLD AUTO: 226 10*3/MM3 (ref 140–450)
PMV BLD AUTO: 11.3 FL (ref 6–12)
POTASSIUM SERPL-SCNC: 4.9 MMOL/L (ref 3.5–5.2)
PROT SERPL-MCNC: 6.8 G/DL (ref 6–8.5)
RBC # BLD AUTO: 4 10*6/MM3 (ref 3.77–5.28)
SODIUM SERPL-SCNC: 139 MMOL/L (ref 136–145)
T4 FREE SERPL-MCNC: 1.12 NG/DL (ref 0.93–1.7)
TIBC SERPL-MCNC: 435 MCG/DL (ref 298–536)
TRANSFERRIN SERPL-MCNC: 292 MG/DL (ref 200–360)
TSH SERPL DL<=0.05 MIU/L-ACNC: 2.72 UIU/ML (ref 0.27–4.2)
VIT B12 BLD-MCNC: 1011 PG/ML (ref 211–946)
WBC # BLD AUTO: 5.36 10*3/MM3 (ref 3.4–10.8)

## 2021-09-07 PROCEDURE — 84439 ASSAY OF FREE THYROXINE: CPT

## 2021-09-07 PROCEDURE — 82306 VITAMIN D 25 HYDROXY: CPT

## 2021-09-07 PROCEDURE — 84466 ASSAY OF TRANSFERRIN: CPT

## 2021-09-07 PROCEDURE — 82607 VITAMIN B-12: CPT

## 2021-09-07 PROCEDURE — 80053 COMPREHEN METABOLIC PANEL: CPT

## 2021-09-07 PROCEDURE — 82728 ASSAY OF FERRITIN: CPT

## 2021-09-07 PROCEDURE — 82746 ASSAY OF FOLIC ACID SERUM: CPT

## 2021-09-07 PROCEDURE — 85025 COMPLETE CBC W/AUTO DIFF WBC: CPT

## 2021-09-07 PROCEDURE — 83540 ASSAY OF IRON: CPT

## 2021-09-07 PROCEDURE — 36415 COLL VENOUS BLD VENIPUNCTURE: CPT

## 2021-09-07 PROCEDURE — 83036 HEMOGLOBIN GLYCOSYLATED A1C: CPT

## 2021-09-07 PROCEDURE — 84443 ASSAY THYROID STIM HORMONE: CPT

## 2021-09-27 RX ORDER — LOSARTAN POTASSIUM 50 MG/1
TABLET ORAL
Qty: 135 TABLET | Refills: 0 | Status: SHIPPED | OUTPATIENT
Start: 2021-09-27 | End: 2022-02-07

## 2021-10-16 PROBLEM — I25.9 IHD (ISCHEMIC HEART DISEASE): Status: ACTIVE | Noted: 2021-10-16

## 2021-10-16 PROBLEM — D53.1 MEGALOBLASTIC ANEMIA DUE TO VITAMIN B12 DEFICIENCY: Status: ACTIVE | Noted: 2021-10-16

## 2021-10-16 PROBLEM — E11.9 TYPE 2 DIABETES MELLITUS WITHOUT COMPLICATION, WITH LONG-TERM CURRENT USE OF INSULIN: Status: ACTIVE | Noted: 2021-10-16

## 2021-10-16 PROBLEM — E78.2 MIXED HYPERLIPIDEMIA: Status: ACTIVE | Noted: 2021-10-16

## 2021-10-16 PROBLEM — N18.31 STAGE 3A CHRONIC KIDNEY DISEASE: Status: ACTIVE | Noted: 2021-10-16

## 2021-10-16 PROBLEM — I10 PRIMARY HYPERTENSION: Status: ACTIVE | Noted: 2021-10-16

## 2021-10-16 PROBLEM — Z79.4 TYPE 2 DIABETES MELLITUS WITHOUT COMPLICATION, WITH LONG-TERM CURRENT USE OF INSULIN: Status: ACTIVE | Noted: 2021-10-16

## 2021-10-16 NOTE — PROGRESS NOTES
"Chief Complaint  Hypertension and Follow-up (No new issues)    Subjective          Lucía Thomas presents to [facility]     History of Present Illness    Patient very pleasant 80-year-old female with underlying hypertension, hyperlipidemia, resultant coronary artery disease prior CABG x2 with no MI, as well as underlying diabetes, who is coming in for routine 6-month follow-up.  We will review her labs and make further recommendations at that time.    Review of Systems   Constitutional: Negative for appetite change, fatigue and fever.   HENT: Negative for congestion and ear pain.    Eyes: Negative for blurred vision.   Respiratory: Negative for cough, chest tightness, shortness of breath and wheezing.    Cardiovascular: Negative for chest pain, palpitations and leg swelling.   Gastrointestinal: Negative for abdominal pain.   Genitourinary: Negative for difficulty urinating, dysuria and hematuria.   Musculoskeletal: Negative for arthralgias and gait problem.   Skin: Negative for skin lesions.   Neurological: Negative for syncope, memory problem and confusion.   Psychiatric/Behavioral: Negative for self-injury and depressed mood.       Objective   Vital Signs:   /75   Pulse 64   Temp 96.6 °F (35.9 °C)   Ht 157.5 cm (62\")   Wt 59.8 kg (131 lb 12.8 oz)   SpO2 97%   BMI 24.11 kg/m²           Physical Exam  Vitals and nursing note reviewed.   Constitutional:       General: She is not in acute distress.     Appearance: Normal appearance. She is not toxic-appearing.   HENT:      Head: Atraumatic.      Right Ear: External ear normal.      Left Ear: External ear normal.      Nose: Nose normal.      Mouth/Throat:      Mouth: Mucous membranes are moist.   Eyes:      General:         Right eye: No discharge.         Left eye: No discharge.      Extraocular Movements: Extraocular movements intact.      Pupils: Pupils are equal, round, and reactive to light.   Cardiovascular:    "   Rate and Rhythm: Normal rate and regular rhythm.      Pulses: Normal pulses.      Heart sounds: Normal heart sounds. No murmur heard.  No gallop.    Pulmonary:      Effort: Pulmonary effort is normal. No respiratory distress.      Breath sounds: No wheezing, rhonchi or rales.   Abdominal:      General: There is no distension.      Palpations: Abdomen is soft. There is no mass.      Tenderness: There is no abdominal tenderness. There is no guarding.   Musculoskeletal:         General: No swelling or tenderness.      Cervical back: No tenderness.      Right lower leg: No edema.      Left lower leg: No edema.   Skin:     General: Skin is warm and dry.      Findings: No rash.   Neurological:      General: No focal deficit present.      Mental Status: She is alert and oriented to person, place, and time. Mental status is at baseline.      Motor: No weakness.      Gait: Gait normal.   Psychiatric:         Mood and Affect: Mood normal.         Thought Content: Thought content normal.          Result Review :   The following data was reviewed by: Juan Alberto Genao MD on 10/19/2021:                  Assessment and Plan    Diagnoses and all orders for this visit:    1. Mixed hyperlipidemia (Primary)  Overview:  LDL was 54 earlier in the year.  That is easily at goal.  Patient stable on moderate dose Karen statin, continue same.    Orders:  -     Lipid Panel; Future    2. Primary hypertension  Overview:  Blood pressure typically well controlled, so will defer the mildly elevated systolic pressure as of her 10/21 office visit.  Patient is stable to continue moderate dose losartan and low-dose metoprolol.    Orders:  -     Comprehensive Metabolic Panel; Future    3. Stage 3a chronic kidney disease (HCC)  Overview:  GFR is down from 50 to 44 as of 10/21 office visit.  Patient is aware to remain hydrated and avoid nonsteroidals other than Tylenol.  She did have to be on some Motrin at the time of this lab test due to tooth  extraction, so that may have bumped his down a little bit.  Additionally we will continue to monitor potassium as well.      4. Type 2 diabetes mellitus without complication, with long-term current use of insulin ([facility])  Overview:  A1c was 7.4 in 9/21.  Patient was seen by her endocrinologist who was comfortable with that level, no changes made.  Patient stable to continue near max dose Metformin and max dose glipizide.    Orders:  -     Hemoglobin A1c; Future    5. Stenosis of right carotid artery  Overview:  This was stable when last imaged in 2020, and patient has had no symptoms that regard since.  She is aware of what symptoms to be on the look out for.      6. Vitamin D deficiency  Overview:  Level is 46 as of 10/21 office visit.  Patient is stable on prescription vitamin D 1 weekly or so.  Continue same.    Orders:  -     Vitamin D 1,25 Dihydroxy; Future    7. IHD (ischemic heart disease)  Overview:  Patient without any ischemic symptoms as of her 10/21 office visit.  She is maintained on baby aspirin and beta-blocker for antianginal regimen, she is stable on this, continue same.  Additionally, she is still followed by Dr Lopez every year or so.      8. Megaloblastic anemia due to vitamin B12 deficiency  Overview:  B12 level was 1000 as of 10/21 off visit.  Patient is stable on over-the-counter supplementation, continue same.    Orders:  -     Vitamin B12 anemia; Future  -     Folate anemia; Future       --  --  VISIT 4/21:  ANNUAL MEDICARE WELLNESS EXAM 4/21 =reviewed all forms with pt in office; no new discoveries; Joseph neg.  --  CAD/CABG 2V '04...s/p cath 4/12...per Dr Sherman; ECHO with EF 60%...10/19 has no ischemia with treadmill up to 2 miles qd or gets outside; cards is only going to see PRN as of 4/19 OV=stress ONLY if sxs---> no sxs 10/20 and saw Dr Lopez recently=stable carotids/neg holter/ECHO stable.    PVD with R carotid lesion 60-79% '09...stable 9/14... 80-90% and has f/u with Dr LOW  Self for this...CTA = 75% and he will follow with dopplers since she doesn't want surgery...was stable/? better 10/17 per Dr Sabino Peralta with f/u later this fall '18...she doesn't want it followed anymore because she is against any surgery unless sxs/etc; is c/w ASA=we d/w this 4/20.    LIPIDS per endo with LDL 64 (9/16)...34 is same 4/20---> 54 is goal 4/21.  --  HTN remains well controlled.  CKD3 = 50% in 10/19 is not bad... > 60%...40% is out of no where; she does not appear dry; had U/S 7/20 per Dr Peralta=neg; I rec repeat in 3 mo in [place]---> 50% as of 4/21 OV is nice to see.  --  DM per endo=Dr Peralta...7.8 (1/15)...invokana instead of victoza now (no help januvia/invokana/elevated lipase with victoza)...8.5 with change to glipizide noted...7.8 is holding and I rec Lantus for tighter control if she does not want any treatment for progressive PVD...7.3 is good trend. (TSH neg 4/17)...6.4...7.7... 7.5 and needs better control given below=d/w Endo ? Lantus 15 qd...7.5 is stuck 4/20 = defer to Endo=they/ I am happy with this ballpark---> 7.3 in 10/20.  PROTEINURIA = 40 to 170 at 4/17 OV and will try to titrate ARB from 25 to 37 and then 50 if BP allows...103 is mild bump and ? related to A1C, so will repeat in 6 mo to ensure no rapid progression...247 and will increase to 75 mg now; call if dizzy; d/w needs tighter BS control; continue 6 mo f/u...good drop and no more room with BP anyhow---> better still 10/20 = ditto 7/3 in 4/21.  --  ANEMIA = down 13.2 to 11.4 past 2 years; rec MWF iron, cologuard, and call...stuck at 11.8 with lowish iron=d/w again to take q MWF=she only took it for a few weeks as of 4/20 OV...11.4 and I d/w again 10/20 that she should stay on the iron and take B12 for level 240=11.3 with same recs 4/21.  GERD at 4/18 OV and I rec pepcid q PM.  --  R LEG=11/19 twisted it, 12/19 = pain, 1/20 = pain but refused meds, 1/30/20 fell on face, 2/20 = knee xray neg, LS spine=spurs L1-4 and went to PT and  they manipulated her hip and made it worse; I want her on gabapentin for the spurs=pain down leg into knee, but she is focused on the knee, so will get MRI for her and take it from there---> had tear meniscus, did not like Dr Epps, she refused therapy, went to PT a month; no daily meds, occ tylenol.  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  --  OSTEOPENIA...BMD 7/12...spine -0.8, hip -1.4...12/17 = spine -1.3, hip -1.6; d/w po Ca+ = tums/etc---> BMD 10/20 = spine -0.4, hip - 1.5 = no tx needed.  VIT D DEF and needs tx now=50K to start...38 is stable---> 31.  --  --  BREAST CA s/p L lump '87.  MMG 9/15/21 at [facility] per me.   COLON 1/13...+polyp...q 5yrs per Dr Manzano...still no sx's, no bld...he rec no more even though had polyps...cologuard neg 9/19.  Pneumovax x 2; Zostavax '13; Prevnar '16; Shingrix x 2.  Covid x2 with Moderna, last was 2/21, and patient is aware that the booster will be available shortly.  [facility]  (, my pt Bruce, 3 kids with Dr Turcios=daughter; has place in [place] now and goes for 3 months q winter---> to go for 6 months '19). --  --    Follow Up   Return in about 6 months (around 4/19/2022).  Patient was given instructions and counseling regarding her condition or for health maintenance advice. Please see specific information pulled into the AVS if appropriate.

## 2021-10-19 ENCOUNTER — OFFICE VISIT (OUTPATIENT)
Dept: INTERNAL MEDICINE | Facility: CLINIC | Age: 81
End: 2021-10-19

## 2021-10-19 VITALS
WEIGHT: 131.8 LBS | DIASTOLIC BLOOD PRESSURE: 75 MMHG | OXYGEN SATURATION: 97 % | BODY MASS INDEX: 24.25 KG/M2 | SYSTOLIC BLOOD PRESSURE: 151 MMHG | HEIGHT: 62 IN | HEART RATE: 64 BPM | TEMPERATURE: 96.6 F

## 2021-10-19 DIAGNOSIS — I65.21 STENOSIS OF RIGHT CAROTID ARTERY: ICD-10-CM

## 2021-10-19 DIAGNOSIS — D53.1 MEGALOBLASTIC ANEMIA DUE TO VITAMIN B12 DEFICIENCY: ICD-10-CM

## 2021-10-19 DIAGNOSIS — N18.31 STAGE 3A CHRONIC KIDNEY DISEASE (HCC): ICD-10-CM

## 2021-10-19 DIAGNOSIS — E11.9 TYPE 2 DIABETES MELLITUS WITHOUT COMPLICATION, WITH LONG-TERM CURRENT USE OF INSULIN (HCC): ICD-10-CM

## 2021-10-19 DIAGNOSIS — E55.9 VITAMIN D DEFICIENCY: ICD-10-CM

## 2021-10-19 DIAGNOSIS — E78.2 MIXED HYPERLIPIDEMIA: Primary | ICD-10-CM

## 2021-10-19 DIAGNOSIS — I10 PRIMARY HYPERTENSION: ICD-10-CM

## 2021-10-19 DIAGNOSIS — I25.9 IHD (ISCHEMIC HEART DISEASE): ICD-10-CM

## 2021-10-19 DIAGNOSIS — Z79.4 TYPE 2 DIABETES MELLITUS WITHOUT COMPLICATION, WITH LONG-TERM CURRENT USE OF INSULIN (HCC): ICD-10-CM

## 2021-10-19 PROCEDURE — 99214 OFFICE O/P EST MOD 30 MIN: CPT | Performed by: INTERNAL MEDICINE

## 2021-10-19 RX ORDER — ASPIRIN 81 MG/1
81 TABLET, COATED ORAL EVERY EVENING
COMMUNITY
Start: 2021-08-01

## 2021-10-19 RX ORDER — METOPROLOL SUCCINATE 50 MG/1
75 TABLET, EXTENDED RELEASE ORAL DAILY
COMMUNITY
Start: 2021-09-07 | End: 2021-11-29

## 2021-10-19 RX ORDER — GLIPIZIDE 5 MG/1
10 TABLET, FILM COATED, EXTENDED RELEASE ORAL 2 TIMES DAILY
COMMUNITY
Start: 2021-10-18 | End: 2023-01-06

## 2021-10-19 RX ORDER — FAMOTIDINE 20 MG/1
TABLET, FILM COATED ORAL
COMMUNITY
End: 2023-01-06

## 2021-10-19 RX ORDER — BLOOD SUGAR DIAGNOSTIC
STRIP MISCELLANEOUS 2 TIMES DAILY
COMMUNITY
Start: 2021-09-20

## 2021-10-19 RX ORDER — ROSUVASTATIN CALCIUM 20 MG/1
20 TABLET, COATED ORAL DAILY
COMMUNITY
Start: 2021-08-01

## 2021-10-19 RX ORDER — ERGOCALCIFEROL 1.25 MG/1
50000 CAPSULE ORAL WEEKLY
COMMUNITY
Start: 2021-09-28 | End: 2021-12-16 | Stop reason: SDUPTHER

## 2021-11-29 RX ORDER — METOPROLOL SUCCINATE 50 MG/1
TABLET, EXTENDED RELEASE ORAL
Qty: 135 TABLET | Refills: 1 | Status: SHIPPED | OUTPATIENT
Start: 2021-11-29 | End: 2022-06-30

## 2021-12-16 RX ORDER — ERGOCALCIFEROL 1.25 MG/1
50000 CAPSULE ORAL WEEKLY
Qty: 12 CAPSULE | Refills: 1 | Status: SHIPPED | OUTPATIENT
Start: 2021-12-16

## 2022-02-07 RX ORDER — LOSARTAN POTASSIUM 50 MG/1
TABLET ORAL
Qty: 135 TABLET | Refills: 1 | Status: SHIPPED | OUTPATIENT
Start: 2022-02-07 | End: 2022-08-12

## 2022-04-14 ENCOUNTER — LAB (OUTPATIENT)
Dept: LAB | Facility: HOSPITAL | Age: 82
End: 2022-04-14

## 2022-04-14 DIAGNOSIS — D53.1 MEGALOBLASTIC ANEMIA DUE TO VITAMIN B12 DEFICIENCY: ICD-10-CM

## 2022-04-14 DIAGNOSIS — I10 PRIMARY HYPERTENSION: ICD-10-CM

## 2022-04-14 DIAGNOSIS — E55.9 VITAMIN D DEFICIENCY: ICD-10-CM

## 2022-04-14 DIAGNOSIS — E78.2 MIXED HYPERLIPIDEMIA: ICD-10-CM

## 2022-04-14 DIAGNOSIS — E11.9 TYPE 2 DIABETES MELLITUS WITHOUT COMPLICATION, WITH LONG-TERM CURRENT USE OF INSULIN: ICD-10-CM

## 2022-04-14 DIAGNOSIS — Z79.4 TYPE 2 DIABETES MELLITUS WITHOUT COMPLICATION, WITH LONG-TERM CURRENT USE OF INSULIN: ICD-10-CM

## 2022-04-14 LAB
ALBUMIN SERPL-MCNC: 4.5 G/DL (ref 3.5–5.2)
ALBUMIN/GLOB SERPL: 1.9 G/DL
ALP SERPL-CCNC: 55 U/L (ref 39–117)
ALT SERPL W P-5'-P-CCNC: 20 U/L (ref 1–33)
ANION GAP SERPL CALCULATED.3IONS-SCNC: 12 MMOL/L (ref 5–15)
AST SERPL-CCNC: 20 U/L (ref 1–32)
BILIRUB SERPL-MCNC: 0.5 MG/DL (ref 0–1.2)
BUN SERPL-MCNC: 17 MG/DL (ref 8–23)
BUN/CREAT SERPL: 10.8 (ref 7–25)
CALCIUM SPEC-SCNC: 9.1 MG/DL (ref 8.6–10.5)
CHLORIDE SERPL-SCNC: 104 MMOL/L (ref 98–107)
CHOLEST SERPL-MCNC: 129 MG/DL (ref 0–200)
CO2 SERPL-SCNC: 24 MMOL/L (ref 22–29)
CREAT SERPL-MCNC: 1.58 MG/DL (ref 0.57–1)
EGFRCR SERPLBLD CKD-EPI 2021: 33 ML/MIN/1.73
FOLATE SERPL-MCNC: 18.2 NG/ML (ref 4.78–24.2)
GLOBULIN UR ELPH-MCNC: 2.4 GM/DL
GLUCOSE SERPL-MCNC: 137 MG/DL (ref 65–99)
HBA1C MFR BLD: 8.5 % (ref 4.8–5.6)
HDLC SERPL-MCNC: 38 MG/DL (ref 40–60)
LDLC SERPL CALC-MCNC: 60 MG/DL (ref 0–100)
LDLC/HDLC SERPL: 1.4 {RATIO}
POTASSIUM SERPL-SCNC: 4.8 MMOL/L (ref 3.5–5.2)
PROT SERPL-MCNC: 6.9 G/DL (ref 6–8.5)
SODIUM SERPL-SCNC: 140 MMOL/L (ref 136–145)
TRIGL SERPL-MCNC: 189 MG/DL (ref 0–150)
VIT B12 BLD-MCNC: 349 PG/ML (ref 211–946)
VLDLC SERPL-MCNC: 31 MG/DL (ref 5–40)

## 2022-04-14 PROCEDURE — 80053 COMPREHEN METABOLIC PANEL: CPT

## 2022-04-14 PROCEDURE — 82746 ASSAY OF FOLIC ACID SERUM: CPT

## 2022-04-14 PROCEDURE — 83036 HEMOGLOBIN GLYCOSYLATED A1C: CPT

## 2022-04-14 PROCEDURE — 82607 VITAMIN B-12: CPT

## 2022-04-14 PROCEDURE — 80061 LIPID PANEL: CPT

## 2022-04-14 PROCEDURE — 36415 COLL VENOUS BLD VENIPUNCTURE: CPT

## 2022-04-14 PROCEDURE — 82652 VIT D 1 25-DIHYDROXY: CPT

## 2022-04-16 LAB — 1,25(OH)2D SERPL-MCNC: 60.3 PG/ML (ref 19.9–79.3)

## 2022-04-21 ENCOUNTER — OFFICE VISIT (OUTPATIENT)
Dept: INTERNAL MEDICINE | Facility: CLINIC | Age: 82
End: 2022-04-21

## 2022-04-21 VITALS
HEIGHT: 62 IN | OXYGEN SATURATION: 97 % | TEMPERATURE: 97.5 F | HEART RATE: 54 BPM | BODY MASS INDEX: 24.66 KG/M2 | SYSTOLIC BLOOD PRESSURE: 122 MMHG | WEIGHT: 134 LBS | DIASTOLIC BLOOD PRESSURE: 64 MMHG

## 2022-04-21 DIAGNOSIS — N18.32 STAGE 3B CHRONIC KIDNEY DISEASE: ICD-10-CM

## 2022-04-21 DIAGNOSIS — Z79.4 TYPE 2 DIABETES MELLITUS WITHOUT COMPLICATION, WITH LONG-TERM CURRENT USE OF INSULIN: ICD-10-CM

## 2022-04-21 DIAGNOSIS — Z00.00 WELL ADULT EXAM: ICD-10-CM

## 2022-04-21 DIAGNOSIS — E78.2 MIXED HYPERLIPIDEMIA: ICD-10-CM

## 2022-04-21 DIAGNOSIS — E55.9 VITAMIN D DEFICIENCY: ICD-10-CM

## 2022-04-21 DIAGNOSIS — E11.9 TYPE 2 DIABETES MELLITUS WITHOUT COMPLICATION, WITH LONG-TERM CURRENT USE OF INSULIN: ICD-10-CM

## 2022-04-21 DIAGNOSIS — D53.1 MEGALOBLASTIC ANEMIA DUE TO VITAMIN B12 DEFICIENCY: ICD-10-CM

## 2022-04-21 DIAGNOSIS — I10 PRIMARY HYPERTENSION: ICD-10-CM

## 2022-04-21 DIAGNOSIS — I25.9 IHD (ISCHEMIC HEART DISEASE): Primary | ICD-10-CM

## 2022-04-21 PROCEDURE — 99214 OFFICE O/P EST MOD 30 MIN: CPT | Performed by: INTERNAL MEDICINE

## 2022-04-21 NOTE — ASSESSMENT & PLAN NOTE
B12 level was 1000 as of 10/21 off visit---> B12 level is down to 350 as of her 4/22 office visit, patient stable on current over-the-counter supplementation.   Subjective:   Patient ID: Marisa Rodriguez is a 81year old female.     HPI  Here to fu after [address] clinic, now has an internest at [address] as well as rheum, htn, hyperchol, cad  /62 (BP Location: Right arm, Patient Position: Sitting, Cuff Size: a (81 mg total) by mouth daily. 0   • Magnesium Gluconate 550 MG Oral Tab Take 30 mg by mouth 2 (two) times daily. • cholecalciferol 25 MCG (1000 UT) Oral Cap Take 5,000 Units by mouth every other day.      • acetaminophen 325 MG Oral Tab Take 325 mg by

## 2022-04-21 NOTE — ASSESSMENT & PLAN NOTE
Vitamin D is stable at 60 as of her 4/22 office visit.  Patient is maintained on prescription strength vitamin D once weekly, continue same.

## 2022-04-21 NOTE — ASSESSMENT & PLAN NOTE
LDL LDL of 60 as of 4/22 office visit is easily at goal.  Patient stable on moderate dose Crestor, continue same.

## 2022-04-21 NOTE — ASSESSMENT & PLAN NOTE
Patient without any ischemic symptoms as of her 4/22 office visit.  She is maintained on baby aspirin and beta-blocker for antianginal regimen, she is stable on this, continue same.  Additionally, she is still followed by Dr Lopez every year or so.

## 2022-04-21 NOTE — ASSESSMENT & PLAN NOTE
GFR is down from 50 to 44 as of 10/21 office visit.  Patient is aware to remain hydrated and avoid nonsteroidals other than Tylenol.  She did have to be on some Motrin at the time of this lab test due to tooth extraction, so that may have bumped his down a little bit.  Additionally we will continue to monitor potassium as well.---> GFR is only 33 as of her 4/22 office visit.  She is up from 14/1.2 to 17/1.6, so this does not appear to be related to volume.  I recommend she be seen by nephrology now, she will discuss this with her endocrinologist next month.

## 2022-04-21 NOTE — PROGRESS NOTES
"Chief Complaint  Hyperlipidemia (Pt is here for routine follow up, has no new issues or concerns.)    Subjective          Lucía Thomas presents to [facility]     History of Present Illness  Patient very pleasant 81-year-old female with underlying hypertension, hyperlipidemia, resultant coronary artery disease prior CABG x2 with no MI, as well as underlying diabetes, who is coming in 4/22 for routine 6-month follow-up.  We will review her labs and make further recommendations at that time.    Review of Systems   Constitutional: Negative for appetite change, fatigue and fever.   HENT: Negative for congestion and ear pain.    Eyes: Negative for blurred vision.   Respiratory: Negative for cough, chest tightness, shortness of breath and wheezing.    Cardiovascular: Negative for chest pain, palpitations and leg swelling.   Gastrointestinal: Negative for abdominal pain.   Genitourinary: Negative for difficulty urinating, dysuria and hematuria.   Musculoskeletal: Negative for arthralgias and gait problem.   Skin: Negative for skin lesions.   Neurological: Negative for syncope, memory problem and confusion.   Psychiatric/Behavioral: Negative for self-injury and depressed mood.       Objective   Vital Signs:   /64   Pulse 54   Temp 97.5 °F (36.4 °C)   Ht 157.5 cm (62.01\")   Wt 60.8 kg (134 lb)   SpO2 97%   BMI 24.50 kg/m²           Physical Exam  Vitals and nursing note reviewed.   Constitutional:       General: She is not in acute distress.     Appearance: Normal appearance. She is not toxic-appearing.   HENT:      Head: Atraumatic.      Right Ear: External ear normal.      Left Ear: External ear normal.      Nose: Nose normal.      Mouth/Throat:      Mouth: Mucous membranes are moist.   Eyes:      General:         Right eye: No discharge.         Left eye: No discharge.      Extraocular Movements: Extraocular movements intact.      Pupils: Pupils are equal, round, and " reactive to light.   Cardiovascular:      Rate and Rhythm: Normal rate and regular rhythm.      Pulses: Normal pulses.      Heart sounds: Normal heart sounds. No murmur heard.    No gallop.   Pulmonary:      Effort: Pulmonary effort is normal. No respiratory distress.      Breath sounds: No wheezing, rhonchi or rales.   Abdominal:      General: There is no distension.      Palpations: Abdomen is soft. There is no mass.      Tenderness: There is no abdominal tenderness. There is no guarding.   Musculoskeletal:         General: No swelling or tenderness.      Cervical back: No tenderness.      Right lower leg: No edema.      Left lower leg: No edema.   Skin:     General: Skin is warm and dry.      Findings: No rash.   Neurological:      General: No focal deficit present.      Mental Status: She is alert and oriented to person, place, and time. Mental status is at baseline.      Motor: No weakness.      Gait: Gait normal.   Psychiatric:         Mood and Affect: Mood normal.         Thought Content: Thought content normal.          Result Review :   The following data was reviewed by: Juan Alberto Genao MD on 10/19/2021:                  Assessment and Plan    Diagnoses and all orders for this visit:    1. IHD (ischemic heart disease) (Primary)  Assessment & Plan:  Patient without any ischemic symptoms as of her 4/22 office visit.  She is maintained on baby aspirin and beta-blocker for antianginal regimen, she is stable on this, continue same.  Additionally, she is still followed by Dr Lopez every year or so.        2. Type 2 diabetes mellitus without complication, with long-term current use of insulin ([facility])  Assessment & Plan:  A1c was 7.4 in 9/21.  Patient was seen by her endocrinologist who was comfortable with that level, no changes made.  Patient stable to continue near max dose Metformin and max dose glipizide.---> A1c is up to 8.5 as of her 4/22 office visit.  This is over the winter months, and additionally the  patient is having an occasional low in the middle the night.  She has appointment with endocrinology next month, will defer to their recommendations.      Orders:  -     Hemoglobin A1c; Future    3. Primary hypertension  Assessment & Plan:  Blood pressures well controlled as of her 4/22 office visit.  Patient is stable to continue moderate dose losartan and low-dose metoprolol.      Orders:  -     Comprehensive Metabolic Panel; Future    4. Mixed hyperlipidemia  Assessment & Plan:  LDL LDL of 60 as of 4/22 office visit is easily at goal.  Patient stable on moderate dose Crestor, continue same.    Orders:  -     Lipid Panel; Future    5. Vitamin D deficiency  Assessment & Plan:  Vitamin D is stable at 60 as of her 4/22 office visit.  Patient is maintained on prescription strength vitamin D once weekly, continue same.      6. Megaloblastic anemia due to vitamin B12 deficiency  Assessment & Plan:  B12 level was 1000 as of 10/21 off visit---> B12 level is down to 350 as of her 4/22 office visit, patient stable on current over-the-counter supplementation.    Orders:  -     CBC & Differential; Future  -     Vitamin B12 anemia; Future  -     Folate anemia; Future    7. Stage 3b chronic kidney disease (HCC)  Assessment & Plan:  GFR is down from 50 to 44 as of 10/21 office visit.  Patient is aware to remain hydrated and avoid nonsteroidals other than Tylenol.  She did have to be on some Motrin at the time of this lab test due to tooth extraction, so that may have bumped his down a little bit.  Additionally we will continue to monitor potassium as well.---> GFR is only 33 as of her 4/22 office visit.  She is up from 14/1.2 to 17/1.6, so this does not appear to be related to volume.  I recommend she be seen by nephrology now, she will discuss this with her endocrinologist next month.        Orders:  -     Cancel: Ambulatory Referral to Nephrology    8. Well adult exam  -     TSH; Future     --  --  VISIT 4/21:  ANNUAL MEDICARE  WELLNESS EXAM 4/21 =reviewed all forms with pt in office; no new discoveries; Joseph neg.  --  CAD/CABG 2V '04...s/p cath 4/12...per Dr Sherman; ECHO with EF 60%...10/19 has no ischemia with treadmill up to 2 miles qd or gets outside; cards is only going to see PRN as of 4/19 OV=stress ONLY if sxs---> no sxs 10/20 and saw Dr Lopez recently=stable carotids/neg holter/ECHO stable.    PVD with R carotid lesion 60-79% '09...stable 9/14... 80-90% and has f/u with Dr MARANDA Peralta for this...CTA = 75% and he will follow with dopplers since she doesn't want surgery...was stable/? better 10/17 per Dr Sabino Peralta with f/u later this fall '18...she doesn't want it followed anymore because she is against any surgery unless sxs/etc; is c/w ASA=we d/w this 4/20.    LIPIDS per endo with LDL 64 (9/16)...34 is same 4/20---> 54 is goal 4/21.  --  HTN remains well controlled.  CKD3 = 50% in 10/19 is not bad... > 60%...40% is out of no where; she does not appear dry; had U/S 7/20 per Dr Peralta=neg; I rec repeat in 3 mo in [place]---> 50% as of 4/21 OV is nice to see.  --  DM per endo=Dr Peralta...7.8 (1/15)...invokana instead of victoza now (no help januvia/invokana/elevated lipase with victoza)...8.5 with change to glipizide noted...7.8 is holding and I rec Lantus for tighter control if she does not want any treatment for progressive PVD...7.3 is good trend. (TSH neg 4/17)...6.4...7.7... 7.5 and needs better control given below=d/w Endo ? Lantus 15 qd...7.5 is stuck 4/20 = defer to Endo=they/ I am happy with this ballpark---> 7.3 in 10/20.  PROTEINURIA = 40 to 170 at 4/17 OV and will try to titrate ARB from 25 to 37 and then 50 if BP allows...103 is mild bump and ? related to A1C, so will repeat in 6 mo to ensure no rapid progression...247 and will increase to 75 mg now; call if dizzy; d/w needs tighter BS control; continue 6 mo f/u...good drop and no more room with BP anyhow---> better still 10/20 = ditto 7/3 in 4/21.  --  ANEMIA = down 13.2  to 11.4 past 2 years; rec MWF iron, cologuard, and call...stuck at 11.8 with lowish iron=d/w again to take q MWF=she only took it for a few weeks as of 4/20 OV...11.4 and I d/w again 10/20 that she should stay on the iron and take B12 for level 240=11.3 with same recs 4/21.  GERD at 4/18 OV and I rec pepcid q PM.  --  R LEG=11/19 twisted it, 12/19 = pain, 1/20 = pain but refused meds, 1/30/20 fell on face, 2/20 = knee xray neg, LS spine=spurs L1-4 and went to PT and they manipulated her hip and made it worse; I want her on gabapentin for the spurs=pain down leg into knee, but she is focused on the knee, so will get MRI for her and take it from there---> had tear meniscus, did not like Dr Epps, she refused therapy, went to PT a month; no daily meds, occ tylenol.  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  --  OSTEOPENIA...BMD 7/12...spine -0.8, hip -1.4...12/17 = spine -1.3, hip -1.6; d/w po Ca+ = tums/etc---> BMD 10/20 = spine -0.4, hip - 1.5 = no tx needed.  VIT D DEF and needs tx now=50K to start...38 is stable---> 31.  --  --  BREAST CA s/p L lump '87.  MMG 9/15/21 at [facility] per me.   COLON 1/13...+polyp...q 5yrs per Dr Manzano...still no sx's, no bld...he rec no more even though had polyps...cologuard neg 9/19.  Pneumovax x 2; Zostavax '13; Prevnar '16; Shingrix x 2.  Covid x2 with Moderna, last was 2/21, and patient is aware that the booster will be available shortly.  Flu shot for 2021 season completed at [facility].  (, my pt Bruce, 3 kids with Dr Turcios=daughter; has place in [place] now and goes for 3 months q winter---> to go for 6 months '19). --  --    Follow Up   Return in about 6 months (around 10/21/2022).  Patient was given instructions and counseling regarding her condition or for health maintenance advice. Please see specific information pulled into the AVS if appropriate.

## 2022-04-21 NOTE — ASSESSMENT & PLAN NOTE
Blood pressures well controlled as of her 4/22 office visit.  Patient is stable to continue moderate dose losartan and low-dose metoprolol.

## 2022-04-21 NOTE — PATIENT INSTRUCTIONS
1.  I would contact your endocrinologist about having a BMP, A1c, and a fructosamine obtained just prior to your appointment with her.

## 2022-05-25 ENCOUNTER — LAB (OUTPATIENT)
Dept: LAB | Facility: HOSPITAL | Age: 82
End: 2022-05-25

## 2022-05-25 ENCOUNTER — TRANSCRIBE ORDERS (OUTPATIENT)
Dept: LAB | Facility: HOSPITAL | Age: 82
End: 2022-05-25

## 2022-05-25 DIAGNOSIS — E78.5 HYPERLIPIDEMIA, UNSPECIFIED HYPERLIPIDEMIA TYPE: ICD-10-CM

## 2022-05-25 DIAGNOSIS — E11.9 DIABETES MELLITUS WITHOUT COMPLICATION: ICD-10-CM

## 2022-05-25 DIAGNOSIS — E11.9 DIABETES MELLITUS WITHOUT COMPLICATION: Primary | ICD-10-CM

## 2022-05-25 DIAGNOSIS — R94.6 NONSPECIFIC ABNORMAL RESULTS OF THYROID FUNCTION STUDY: ICD-10-CM

## 2022-05-25 DIAGNOSIS — E55.9 VITAMIN D DEFICIENCY: ICD-10-CM

## 2022-05-25 LAB
ALBUMIN SERPL-MCNC: 4.4 G/DL (ref 3.5–5.2)
ALBUMIN UR-MCNC: 8.3 MG/DL
ALBUMIN/GLOB SERPL: 1.8 G/DL
ALP SERPL-CCNC: 53 U/L (ref 39–117)
ALT SERPL W P-5'-P-CCNC: 21 U/L (ref 1–33)
ANION GAP SERPL CALCULATED.3IONS-SCNC: 12.5 MMOL/L (ref 5–15)
AST SERPL-CCNC: 22 U/L (ref 1–32)
BILIRUB SERPL-MCNC: 0.5 MG/DL (ref 0–1.2)
BUN SERPL-MCNC: 18 MG/DL (ref 8–23)
BUN/CREAT SERPL: 11.7 (ref 7–25)
CALCIUM SPEC-SCNC: 9.3 MG/DL (ref 8.6–10.5)
CHLORIDE SERPL-SCNC: 101 MMOL/L (ref 98–107)
CHOLEST SERPL-MCNC: 117 MG/DL (ref 0–200)
CO2 SERPL-SCNC: 22.5 MMOL/L (ref 22–29)
CREAT SERPL-MCNC: 1.54 MG/DL (ref 0.57–1)
CREAT UR-MCNC: 44.3 MG/DL
EGFRCR SERPLBLD CKD-EPI 2021: 34 ML/MIN/1.73
GLOBULIN UR ELPH-MCNC: 2.4 GM/DL
GLUCOSE SERPL-MCNC: 144 MG/DL (ref 65–99)
HBA1C MFR BLD: 7.3 % (ref 4.8–5.6)
HDLC SERPL-MCNC: 38 MG/DL (ref 40–60)
LDLC SERPL CALC-MCNC: 50 MG/DL (ref 0–100)
LDLC/HDLC SERPL: 1.16 {RATIO}
MICROALBUMIN/CREAT UR: 187.4 MG/G
POTASSIUM SERPL-SCNC: 4.8 MMOL/L (ref 3.5–5.2)
PROT SERPL-MCNC: 6.8 G/DL (ref 6–8.5)
SODIUM SERPL-SCNC: 136 MMOL/L (ref 136–145)
TRIGL SERPL-MCNC: 174 MG/DL (ref 0–150)
TSH SERPL DL<=0.05 MIU/L-ACNC: 4.33 UIU/ML (ref 0.27–4.2)
VIT B12 BLD-MCNC: 377 PG/ML (ref 211–946)
VLDLC SERPL-MCNC: 29 MG/DL (ref 5–40)

## 2022-05-25 PROCEDURE — 82607 VITAMIN B-12: CPT

## 2022-05-25 PROCEDURE — 80053 COMPREHEN METABOLIC PANEL: CPT

## 2022-05-25 PROCEDURE — 36415 COLL VENOUS BLD VENIPUNCTURE: CPT

## 2022-05-25 PROCEDURE — 82043 UR ALBUMIN QUANTITATIVE: CPT

## 2022-05-25 PROCEDURE — 82570 ASSAY OF URINE CREATININE: CPT

## 2022-05-25 PROCEDURE — 84443 ASSAY THYROID STIM HORMONE: CPT

## 2022-05-25 PROCEDURE — 80061 LIPID PANEL: CPT

## 2022-05-25 PROCEDURE — 83036 HEMOGLOBIN GLYCOSYLATED A1C: CPT

## 2022-05-31 ENCOUNTER — TRANSCRIBE ORDERS (OUTPATIENT)
Dept: ADMINISTRATIVE | Facility: HOSPITAL | Age: 82
End: 2022-05-31

## 2022-05-31 DIAGNOSIS — N28.9 RENAL INSUFFICIENCY: Primary | ICD-10-CM

## 2022-06-09 ENCOUNTER — HOSPITAL ENCOUNTER (OUTPATIENT)
Dept: ULTRASOUND IMAGING | Facility: HOSPITAL | Age: 82
Discharge: HOME | End: 2022-06-09
Admitting: INTERNAL MEDICINE

## 2022-06-09 DIAGNOSIS — N28.9 RENAL INSUFFICIENCY: ICD-10-CM

## 2022-06-09 PROCEDURE — 76775 US EXAM ABDO BACK WALL LIM: CPT

## 2022-06-22 ENCOUNTER — TRANSCRIBE ORDERS (OUTPATIENT)
Dept: ADMINISTRATIVE | Facility: HOSPITAL | Age: 82
End: 2022-06-22

## 2022-06-22 DIAGNOSIS — N18.30 STAGE 3 CHRONIC KIDNEY DISEASE, UNSPECIFIED WHETHER STAGE 3A OR 3B CKD: Primary | ICD-10-CM

## 2022-06-22 DIAGNOSIS — I12.9 BENIGN HYPERTENSIVE RENAL DISEASE: ICD-10-CM

## 2022-06-30 ENCOUNTER — HOSPITAL ENCOUNTER (OUTPATIENT)
Dept: CARDIOLOGY | Facility: HOSPITAL | Age: 82
Discharge: HOME | End: 2022-06-30
Admitting: INTERNAL MEDICINE

## 2022-06-30 DIAGNOSIS — N18.30 STAGE 3 CHRONIC KIDNEY DISEASE, UNSPECIFIED WHETHER STAGE 3A OR 3B CKD: ICD-10-CM

## 2022-06-30 DIAGNOSIS — I12.9 BENIGN HYPERTENSIVE RENAL DISEASE: ICD-10-CM

## 2022-06-30 LAB
BH CV ECHO MEAS - DIST REN A EDV LEFT: 9 CM/S
BH CV ECHO MEAS - DIST REN A PSV LEFT: 52 CM/S
BH CV ECHO MEAS - MID REN A EDV LEFT: 13 CM/S
BH CV ECHO MEAS - MID REN A PSV LEFT: 70 CM/S
BH CV ECHO MEAS - PROX REN A EDV LEFT: 8 CM/S
BH CV ECHO MEAS - PROX REN A PSV LEFT: 61 CM/S
BH CV VAS BP LEFT ARM: NORMAL MMHG
BH CV VAS BP RIGHT ARM: NORMAL MMHG
BH CV VAS KIDNEY HEIGHT LEFT: 5 CM
BH CV VAS RENAL AORTIC MID EDV: 17 CM/S
BH CV VAS RENAL AORTIC MID PSV: 66 CM/S
BH CV VAS RENAL HILUM LEFT EDV: 9 CM/S
BH CV VAS RENAL HILUM LEFT PSV: 33 CM/S
BH CV VAS RENAL HILUM RIGHT EDV: 6 CM/S
BH CV VAS RENAL HILUM RIGHT PSV: 29 CM/S
BH CV XLRA MEAS - KID L LEFT: 10 CM
BH CV XLRA MEAS DIST REN A EDV RIGHT: 15 CM/S
BH CV XLRA MEAS DIST REN A PSV RIGHT: 76 CM/S
BH CV XLRA MEAS KID H RIGHT: 4 CM
BH CV XLRA MEAS KID L RIGHT: 10 CM
BH CV XLRA MEAS KID W RIGHT: 6 CM
BH CV XLRA MEAS MID REN A EDV RIGHT: 12 CM/S
BH CV XLRA MEAS MID REN A PSV RIGHT: 49 CM/S
BH CV XLRA MEAS PROX REN A EDV RIGHT: 14 CM/S
BH CV XLRA MEAS PROX REN A PSV RIGHT: 48 CM/S
BH CV XLRA MEAS RAR LEFT: 1.1
BH CV XLRA MEAS RAR RIGHT: 1.1
BH CV XLRA MEAS RENAL A ORG EDV LEFT: 8 CM/S
BH CV XLRA MEAS RENAL A ORG EDV RIGHT: 13 CM/S
BH CV XLRA MEAS RENAL A ORG PSV LEFT: 41 CM/S
BH CV XLRA MEAS RENAL A ORG PSV RIGHT: 45 CM/S
LEFT KIDNEY WIDTH: 4 CM
LEFT RENAL UPPER PARENCHYMA MAX: 29 CM/S
LEFT RENAL UPPER PARENCHYMA MIN: 5 CM/S
LEFT RENAL UPPER PARENCHYMA RI: 0.83
MAXIMAL PREDICTED HEART RATE: 140 BPM
RIGHT RENAL UPPER PARENCHYMA MAX: 17 CM/S
RIGHT RENAL UPPER PARENCHYMA MIN: 2 CM/S
RIGHT RENAL UPPER PARENCHYMA RI: 0.88
STRESS TARGET HR: 119 BPM

## 2022-06-30 PROCEDURE — 93975 VASCULAR STUDY: CPT

## 2022-06-30 PROCEDURE — 93975 VASCULAR STUDY: CPT | Performed by: SURGERY

## 2022-06-30 RX ORDER — METOPROLOL SUCCINATE 50 MG/1
TABLET, EXTENDED RELEASE ORAL
Qty: 135 TABLET | Refills: 1 | Status: SHIPPED | OUTPATIENT
Start: 2022-06-30 | End: 2023-01-09

## 2022-07-29 ENCOUNTER — LAB (OUTPATIENT)
Dept: LAB | Facility: HOSPITAL | Age: 82
End: 2022-07-29

## 2022-07-29 ENCOUNTER — TRANSCRIBE ORDERS (OUTPATIENT)
Dept: LAB | Facility: HOSPITAL | Age: 82
End: 2022-07-29

## 2022-07-29 DIAGNOSIS — E11.21 TYPE 2 DIABETES MELLITUS WITH DIABETIC NEPHROPATHY, UNSPECIFIED WHETHER LONG TERM INSULIN USE: Primary | ICD-10-CM

## 2022-07-29 DIAGNOSIS — E11.21 TYPE 2 DIABETES MELLITUS WITH DIABETIC NEPHROPATHY, UNSPECIFIED WHETHER LONG TERM INSULIN USE: ICD-10-CM

## 2022-07-29 LAB
ALBUMIN SERPL-MCNC: 4.1 G/DL (ref 3.5–5.2)
ALBUMIN/GLOB SERPL: 1.3 G/DL
ALP SERPL-CCNC: 69 U/L (ref 39–117)
ALT SERPL W P-5'-P-CCNC: 15 U/L (ref 1–33)
ANION GAP SERPL CALCULATED.3IONS-SCNC: 11 MMOL/L (ref 5–15)
AST SERPL-CCNC: 15 U/L (ref 1–32)
BILIRUB SERPL-MCNC: 0.5 MG/DL (ref 0–1.2)
BUN SERPL-MCNC: 13 MG/DL (ref 8–23)
BUN/CREAT SERPL: 9.4 (ref 7–25)
CALCIUM SPEC-SCNC: 9.3 MG/DL (ref 8.6–10.5)
CHLORIDE SERPL-SCNC: 104 MMOL/L (ref 98–107)
CO2 SERPL-SCNC: 25 MMOL/L (ref 22–29)
CREAT SERPL-MCNC: 1.39 MG/DL (ref 0.57–1)
EGFRCR SERPLBLD CKD-EPI 2021: 38.4 ML/MIN/1.73
GLOBULIN UR ELPH-MCNC: 3.1 GM/DL
GLUCOSE SERPL-MCNC: 183 MG/DL (ref 65–99)
HBA1C MFR BLD: 8.3 % (ref 4.8–5.6)
POTASSIUM SERPL-SCNC: 4.7 MMOL/L (ref 3.5–5.2)
PROT SERPL-MCNC: 7.2 G/DL (ref 6–8.5)
SODIUM SERPL-SCNC: 140 MMOL/L (ref 136–145)

## 2022-07-29 PROCEDURE — 83036 HEMOGLOBIN GLYCOSYLATED A1C: CPT

## 2022-07-29 PROCEDURE — 36415 COLL VENOUS BLD VENIPUNCTURE: CPT

## 2022-07-29 PROCEDURE — 80053 COMPREHEN METABOLIC PANEL: CPT

## 2022-08-12 RX ORDER — LOSARTAN POTASSIUM 50 MG/1
TABLET ORAL
Qty: 135 TABLET | Refills: 1 | Status: SHIPPED | OUTPATIENT
Start: 2022-08-12 | End: 2023-02-20

## 2022-09-06 ENCOUNTER — TRANSCRIBE ORDERS (OUTPATIENT)
Dept: LAB | Facility: HOSPITAL | Age: 82
End: 2022-09-06

## 2022-09-06 ENCOUNTER — LAB (OUTPATIENT)
Dept: LAB | Facility: HOSPITAL | Age: 82
End: 2022-09-06

## 2022-09-06 DIAGNOSIS — R30.0 DYSURIA: ICD-10-CM

## 2022-09-06 DIAGNOSIS — E11.21 DIABETIC GLOMERULOPATHY: ICD-10-CM

## 2022-09-06 DIAGNOSIS — R94.6 NONSPECIFIC ABNORMAL RESULTS OF THYROID FUNCTION STUDY: Primary | ICD-10-CM

## 2022-09-06 DIAGNOSIS — R94.6 NONSPECIFIC ABNORMAL RESULTS OF THYROID FUNCTION STUDY: ICD-10-CM

## 2022-09-06 DIAGNOSIS — R30.0 DYSURIA: Primary | ICD-10-CM

## 2022-09-06 DIAGNOSIS — D53.1 MEGALOBLASTIC ANEMIA DUE TO VITAMIN B12 DEFICIENCY: ICD-10-CM

## 2022-09-06 LAB
ALBUMIN SERPL-MCNC: 4.4 G/DL (ref 3.5–5.2)
ALBUMIN/GLOB SERPL: 1.7 G/DL
ALP SERPL-CCNC: 76 U/L (ref 39–117)
ALT SERPL W P-5'-P-CCNC: 11 U/L (ref 1–33)
ANION GAP SERPL CALCULATED.3IONS-SCNC: 10.2 MMOL/L (ref 5–15)
AST SERPL-CCNC: 16 U/L (ref 1–32)
BACTERIA UR QL AUTO: ABNORMAL /HPF
BASOPHILS # BLD AUTO: 0.04 10*3/MM3 (ref 0–0.2)
BASOPHILS NFR BLD AUTO: 0.7 % (ref 0–1.5)
BILIRUB SERPL-MCNC: 0.4 MG/DL (ref 0–1.2)
BILIRUB UR QL STRIP: NEGATIVE
BUN SERPL-MCNC: 11 MG/DL (ref 8–23)
BUN/CREAT SERPL: 6.7 (ref 7–25)
CALCIUM SPEC-SCNC: 9.4 MG/DL (ref 8.6–10.5)
CHLORIDE SERPL-SCNC: 102 MMOL/L (ref 98–107)
CLARITY UR: CLEAR
CO2 SERPL-SCNC: 24.8 MMOL/L (ref 22–29)
COLOR UR: YELLOW
CREAT SERPL-MCNC: 1.63 MG/DL (ref 0.57–1)
DEPRECATED RDW RBC AUTO: 38.1 FL (ref 37–54)
EGFRCR SERPLBLD CKD-EPI 2021: 31.8 ML/MIN/1.73
EOSINOPHIL # BLD AUTO: 0.36 10*3/MM3 (ref 0–0.4)
EOSINOPHIL NFR BLD AUTO: 6.3 % (ref 0.3–6.2)
ERYTHROCYTE [DISTWIDTH] IN BLOOD BY AUTOMATED COUNT: 12.1 % (ref 12.3–15.4)
GLOBULIN UR ELPH-MCNC: 2.6 GM/DL
GLUCOSE SERPL-MCNC: 155 MG/DL (ref 65–99)
GLUCOSE UR STRIP-MCNC: NEGATIVE MG/DL
HBA1C MFR BLD: 8 % (ref 4.8–5.6)
HCT VFR BLD AUTO: 36.4 % (ref 34–46.6)
HGB BLD-MCNC: 12.1 G/DL (ref 12–15.9)
HGB UR QL STRIP.AUTO: NEGATIVE
HYALINE CASTS UR QL AUTO: ABNORMAL /LPF
IMM GRANULOCYTES # BLD AUTO: 0.01 10*3/MM3 (ref 0–0.05)
IMM GRANULOCYTES NFR BLD AUTO: 0.2 % (ref 0–0.5)
KETONES UR QL STRIP: NEGATIVE
LEUKOCYTE ESTERASE UR QL STRIP.AUTO: ABNORMAL
LYMPHOCYTES # BLD AUTO: 1.66 10*3/MM3 (ref 0.7–3.1)
LYMPHOCYTES NFR BLD AUTO: 29.2 % (ref 19.6–45.3)
MCH RBC QN AUTO: 29.1 PG (ref 26.6–33)
MCHC RBC AUTO-ENTMCNC: 33.2 G/DL (ref 31.5–35.7)
MCV RBC AUTO: 87.5 FL (ref 79–97)
MONOCYTES # BLD AUTO: 0.44 10*3/MM3 (ref 0.1–0.9)
MONOCYTES NFR BLD AUTO: 7.7 % (ref 5–12)
NEUTROPHILS NFR BLD AUTO: 3.17 10*3/MM3 (ref 1.7–7)
NEUTROPHILS NFR BLD AUTO: 55.9 % (ref 42.7–76)
NITRITE UR QL STRIP: NEGATIVE
NRBC BLD AUTO-RTO: 0.2 /100 WBC (ref 0–0.2)
PH UR STRIP.AUTO: 6 [PH] (ref 5–8)
PLATELET # BLD AUTO: 250 10*3/MM3 (ref 140–450)
PMV BLD AUTO: 10.9 FL (ref 6–12)
POTASSIUM SERPL-SCNC: 4.9 MMOL/L (ref 3.5–5.2)
PROT SERPL-MCNC: 7 G/DL (ref 6–8.5)
PROT UR QL STRIP: ABNORMAL
RBC # BLD AUTO: 4.16 10*6/MM3 (ref 3.77–5.28)
RBC # UR STRIP: ABNORMAL /HPF
REF LAB TEST METHOD: ABNORMAL
SODIUM SERPL-SCNC: 137 MMOL/L (ref 136–145)
SP GR UR STRIP: 1.01 (ref 1–1.03)
SQUAMOUS #/AREA URNS HPF: ABNORMAL /HPF
T4 FREE SERPL-MCNC: 1.09 NG/DL (ref 0.93–1.7)
TSH SERPL DL<=0.05 MIU/L-ACNC: 2.95 UIU/ML (ref 0.27–4.2)
UROBILINOGEN UR QL STRIP: ABNORMAL
WBC # UR STRIP: ABNORMAL /HPF
WBC NRBC COR # BLD: 5.68 10*3/MM3 (ref 3.4–10.8)

## 2022-09-06 PROCEDURE — 80053 COMPREHEN METABOLIC PANEL: CPT

## 2022-09-06 PROCEDURE — 85025 COMPLETE CBC W/AUTO DIFF WBC: CPT

## 2022-09-06 PROCEDURE — 87086 URINE CULTURE/COLONY COUNT: CPT

## 2022-09-06 PROCEDURE — 83036 HEMOGLOBIN GLYCOSYLATED A1C: CPT

## 2022-09-06 PROCEDURE — 84443 ASSAY THYROID STIM HORMONE: CPT

## 2022-09-06 PROCEDURE — 36415 COLL VENOUS BLD VENIPUNCTURE: CPT

## 2022-09-06 PROCEDURE — 84439 ASSAY OF FREE THYROXINE: CPT

## 2022-09-06 PROCEDURE — 81001 URINALYSIS AUTO W/SCOPE: CPT

## 2022-09-07 LAB — BACTERIA SPEC AEROBE CULT: NO GROWTH

## 2022-10-07 ENCOUNTER — LAB (OUTPATIENT)
Dept: LAB | Facility: HOSPITAL | Age: 82
End: 2022-10-07

## 2022-10-07 DIAGNOSIS — E78.2 MIXED HYPERLIPIDEMIA: ICD-10-CM

## 2022-10-07 DIAGNOSIS — I10 PRIMARY HYPERTENSION: ICD-10-CM

## 2022-10-07 DIAGNOSIS — Z00.00 WELL ADULT EXAM: ICD-10-CM

## 2022-10-07 DIAGNOSIS — Z79.4 TYPE 2 DIABETES MELLITUS WITHOUT COMPLICATION, WITH LONG-TERM CURRENT USE OF INSULIN: ICD-10-CM

## 2022-10-07 DIAGNOSIS — E11.9 TYPE 2 DIABETES MELLITUS WITHOUT COMPLICATION, WITH LONG-TERM CURRENT USE OF INSULIN: ICD-10-CM

## 2022-10-07 DIAGNOSIS — D53.1 MEGALOBLASTIC ANEMIA DUE TO VITAMIN B12 DEFICIENCY: ICD-10-CM

## 2022-10-07 LAB
ALBUMIN SERPL-MCNC: 4.3 G/DL (ref 3.5–5.2)
ALBUMIN/GLOB SERPL: 1.5 G/DL
ALP SERPL-CCNC: 75 U/L (ref 39–117)
ALT SERPL W P-5'-P-CCNC: 9 U/L (ref 1–33)
ANION GAP SERPL CALCULATED.3IONS-SCNC: 8.6 MMOL/L (ref 5–15)
AST SERPL-CCNC: 17 U/L (ref 1–32)
BILIRUB SERPL-MCNC: 0.9 MG/DL (ref 0–1.2)
BUN SERPL-MCNC: 15 MG/DL (ref 8–23)
BUN/CREAT SERPL: 9.3 (ref 7–25)
CALCIUM SPEC-SCNC: 9.3 MG/DL (ref 8.6–10.5)
CHLORIDE SERPL-SCNC: 104 MMOL/L (ref 98–107)
CHOLEST SERPL-MCNC: 176 MG/DL (ref 0–200)
CO2 SERPL-SCNC: 26.4 MMOL/L (ref 22–29)
CREAT SERPL-MCNC: 1.62 MG/DL (ref 0.57–1)
EGFRCR SERPLBLD CKD-EPI 2021: 32 ML/MIN/1.73
FOLATE SERPL-MCNC: >20 NG/ML (ref 4.78–24.2)
GLOBULIN UR ELPH-MCNC: 2.9 GM/DL
GLUCOSE SERPL-MCNC: 143 MG/DL (ref 65–99)
HBA1C MFR BLD: 7.6 % (ref 4.8–5.6)
HDLC SERPL-MCNC: 44 MG/DL (ref 40–60)
LDLC SERPL CALC-MCNC: 106 MG/DL (ref 0–100)
LDLC/HDLC SERPL: 2.32 {RATIO}
POTASSIUM SERPL-SCNC: 4.8 MMOL/L (ref 3.5–5.2)
PROT SERPL-MCNC: 7.2 G/DL (ref 6–8.5)
SODIUM SERPL-SCNC: 139 MMOL/L (ref 136–145)
TRIGL SERPL-MCNC: 150 MG/DL (ref 0–150)
TSH SERPL DL<=0.05 MIU/L-ACNC: 2.83 UIU/ML (ref 0.27–4.2)
VIT B12 BLD-MCNC: 223 PG/ML (ref 211–946)
VLDLC SERPL-MCNC: 26 MG/DL (ref 5–40)

## 2022-10-07 PROCEDURE — 82607 VITAMIN B-12: CPT

## 2022-10-07 PROCEDURE — 82746 ASSAY OF FOLIC ACID SERUM: CPT

## 2022-10-07 PROCEDURE — 84443 ASSAY THYROID STIM HORMONE: CPT

## 2022-10-07 PROCEDURE — 83036 HEMOGLOBIN GLYCOSYLATED A1C: CPT

## 2022-10-07 PROCEDURE — 80061 LIPID PANEL: CPT

## 2022-10-07 PROCEDURE — 36415 COLL VENOUS BLD VENIPUNCTURE: CPT

## 2022-10-07 PROCEDURE — 80053 COMPREHEN METABOLIC PANEL: CPT

## 2022-10-20 ENCOUNTER — OFFICE VISIT (OUTPATIENT)
Dept: INTERNAL MEDICINE | Facility: CLINIC | Age: 82
End: 2022-10-20

## 2022-10-20 VITALS
TEMPERATURE: 97 F | WEIGHT: 127.6 LBS | SYSTOLIC BLOOD PRESSURE: 122 MMHG | HEART RATE: 68 BPM | DIASTOLIC BLOOD PRESSURE: 56 MMHG | HEIGHT: 62 IN | BODY MASS INDEX: 23.48 KG/M2 | OXYGEN SATURATION: 99 %

## 2022-10-20 DIAGNOSIS — I10 PRIMARY HYPERTENSION: ICD-10-CM

## 2022-10-20 DIAGNOSIS — I25.9 IHD (ISCHEMIC HEART DISEASE): Primary | ICD-10-CM

## 2022-10-20 DIAGNOSIS — D53.1 MEGALOBLASTIC ANEMIA DUE TO VITAMIN B12 DEFICIENCY: ICD-10-CM

## 2022-10-20 DIAGNOSIS — E55.9 VITAMIN D DEFICIENCY: ICD-10-CM

## 2022-10-20 DIAGNOSIS — E78.2 MIXED HYPERLIPIDEMIA: ICD-10-CM

## 2022-10-20 DIAGNOSIS — N18.32 STAGE 3B CHRONIC KIDNEY DISEASE: ICD-10-CM

## 2022-10-20 DIAGNOSIS — Z79.4 TYPE 2 DIABETES MELLITUS WITHOUT COMPLICATION, WITH LONG-TERM CURRENT USE OF INSULIN: ICD-10-CM

## 2022-10-20 DIAGNOSIS — E11.9 TYPE 2 DIABETES MELLITUS WITHOUT COMPLICATION, WITH LONG-TERM CURRENT USE OF INSULIN: ICD-10-CM

## 2022-10-20 PROCEDURE — 99214 OFFICE O/P EST MOD 30 MIN: CPT | Performed by: INTERNAL MEDICINE

## 2022-10-20 RX ORDER — DULAGLUTIDE 0.75 MG/.5ML
0.75 INJECTION, SOLUTION SUBCUTANEOUS
COMMUNITY
Start: 2022-10-08 | End: 2023-01-06

## 2022-10-20 NOTE — ASSESSMENT & PLAN NOTE
LDL is up from 50-1 06 as of her 10/22 office visit.  Patient remains compliant with moderate dose Crestor.  There is been no changes in her diet, her weight is very stable.  Discussed with probable lab abnormality, we will see what it is on return to office.

## 2022-10-20 NOTE — ASSESSMENT & PLAN NOTE
Patient no ischemia as of 10/22.  She will continue with aspirin and beta-blocker only for antianginal benefit.

## 2022-10-20 NOTE — ASSESSMENT & PLAN NOTE
A1c is trended down gradually from 8.5 to 7.6 as of her 10/22 office visit.  Patient did not benefit from Jardiance in the past, she had side effects to Rybelsus, so currently is on low-dose Trulicity.  This is at the direction of her endocrinologist, she is also on full dose glipizide.

## 2022-10-20 NOTE — ASSESSMENT & PLAN NOTE
B12 has gradually trended down to 220 as of her 10/22 office visit.  She is going to get back on it at this time.

## 2022-10-20 NOTE — ASSESSMENT & PLAN NOTE
GFR is stable at 32 as of 10/22 office visit.  She is not acidotic, her potassium is within normal limits.  She did see Dr. Rogers in [place], she had a renal ultrasound, had a random urinalysis, with no changes made.  She has follow-up with him in about 6 months.

## 2022-10-20 NOTE — PROGRESS NOTES
"Chief Complaint  Diabetes and Follow-up (Pt states that this is routine, she states that she had labs, she states that she doesn't have any new issues. )    Subjective          Lucía Thomas presents to [facility]     History of Present Illness  Patient very pleasant 81-year-old female with underlying hypertension, hyperlipidemia, resultant coronary artery disease prior CABG x2 with no MI, as well as underlying diabetes, who is coming in 10/22 for routine 6-month follow-up.  We will review her labs and make further recommendations at that time.    Review of Systems   Constitutional: Negative for appetite change, fatigue and fever.   HENT: Negative for congestion and ear pain.    Eyes: Negative for blurred vision.   Respiratory: Negative for cough, chest tightness, shortness of breath and wheezing.    Cardiovascular: Negative for chest pain, palpitations and leg swelling.   Gastrointestinal: Negative for abdominal pain.   Genitourinary: Negative for difficulty urinating, dysuria and hematuria.   Musculoskeletal: Negative for arthralgias and gait problem.   Skin: Negative for skin lesions.   Neurological: Negative for syncope, memory problem and confusion.   Psychiatric/Behavioral: Negative for self-injury and depressed mood.       Objective   Vital Signs:   /56 (BP Location: Right arm, Patient Position: Sitting, Cuff Size: Adult)   Pulse 68   Temp 97 °F (36.1 °C) (Skin)   Ht 157.5 cm (62.01\")   Wt 57.9 kg (127 lb 9.6 oz)   SpO2 99%   BMI 23.33 kg/m²           Physical Exam  Vitals and nursing note reviewed.   Constitutional:       General: She is not in acute distress.     Appearance: Normal appearance. She is not toxic-appearing.   HENT:      Head: Atraumatic.      Right Ear: External ear normal.      Left Ear: External ear normal.      Nose: Nose normal.      Mouth/Throat:      Mouth: Mucous membranes are moist.   Eyes:      General:         Right eye: No " discharge.         Left eye: No discharge.      Extraocular Movements: Extraocular movements intact.      Pupils: Pupils are equal, round, and reactive to light.   Cardiovascular:      Rate and Rhythm: Normal rate and regular rhythm.      Pulses: Normal pulses.      Heart sounds: Normal heart sounds. No murmur heard.    No gallop.   Pulmonary:      Effort: Pulmonary effort is normal. No respiratory distress.      Breath sounds: No wheezing, rhonchi or rales.   Abdominal:      General: There is no distension.      Palpations: Abdomen is soft. There is no mass.      Tenderness: There is no abdominal tenderness. There is no guarding.   Musculoskeletal:         General: No swelling or tenderness.      Cervical back: No tenderness.      Right lower leg: No edema.      Left lower leg: No edema.   Skin:     General: Skin is warm and dry.      Findings: No rash.   Neurological:      General: No focal deficit present.      Mental Status: She is alert and oriented to person, place, and time. Mental status is at baseline.      Motor: No weakness.      Gait: Gait normal.   Psychiatric:         Mood and Affect: Mood normal.         Thought Content: Thought content normal.          Result Review :   The following data was reviewed by: Juan Alberto Genao MD on 10/19/2021:                  Assessment and Plan    Diagnoses and all orders for this visit:    1. IHD (ischemic heart disease) (Primary)  Overview:  She is followed by Dr Lopez every year or so.    Assessment & Plan:  Patient no ischemia as of 10/22.  She will continue with aspirin and beta-blocker only for antianginal benefit.      2. Mixed hyperlipidemia  Assessment & Plan:  LDL is up from 50-1 06 as of her 10/22 office visit.  Patient remains compliant with moderate dose Crestor.  There is been no changes in her diet, her weight is very stable.  Discussed with probable lab abnormality, we will see what it is on return to office.    Orders:  -     Lipid Panel;  Future    3. Primary hypertension  Assessment & Plan:  Blood pressure easily controlled as of 10/22 office visit.  Patient stable to continue moderate dose losartan and low-dose metoprolol.    Orders:  -     Comprehensive Metabolic Panel; Future    4. Stage 3b chronic kidney disease (HCC)  Assessment & Plan:  GFR is stable at 32 as of 10/22 office visit.  She is not acidotic, her potassium is within normal limits.  She did see Dr. Rogers in [place], she had a renal ultrasound, had a random urinalysis, with no changes made.  She has follow-up with him in about 6 months.    Orders:  -     CBC & Differential; Future    5. Type 2 diabetes mellitus without complication, with long-term current use of insulin ([facility])  Assessment & Plan:  A1c is trended down gradually from 8.5 to 7.6 as of her 10/22 office visit.  Patient did not benefit from Jardiance in the past, she had side effects to Rybelsus, so currently is on low-dose Trulicity.  This is at the direction of her endocrinologist, she is also on full dose glipizide.      6. Megaloblastic anemia due to vitamin B12 deficiency  Assessment & Plan:  B12 has gradually trended down to 220 as of her 10/22 office visit.  She is going to get back on it at this time.    Orders:  -     Vitamin B12 anemia; Future  -     Folate anemia; Future    7. Vitamin D deficiency  -     Vitamin D,25-Hydroxy; Future     --  --  VISIT 4/21:  ANNUAL MEDICARE WELLNESS EXAM 4/21 =reviewed all forms with pt in office; no new discoveries; Joseph neg.  --  CAD/CABG 2V '04...s/p cath 4/12...per Dr Sherman; ECHO with EF 60%...10/19 has no ischemia with treadmill up to 2 miles qd or gets outside; cards is only going to see PRN as of 4/19 OV=stress ONLY if sxs---> no sxs 10/20 and saw Dr Lopez recently=stable carotids/neg holter/ECHO stable.    PVD with R carotid lesion 60-79% '09...stable 9/14... 80-90% and has f/u with Dr MARANDA Peralta for this...CTA = 75% and he will follow with dopplers since she doesn't  want surgery...was stable/? better 10/17 per Dr Sabino Peralta with f/u later this fall '18...she doesn't want it followed anymore because she is against any surgery unless sxs/etc; is c/w ASA=we d/w this 4/20.    LIPIDS per endo with LDL 64 (9/16)...34 is same 4/20---> 54 is goal 4/21.  --  HTN remains well controlled.  CKD3 = 50% in 10/19 is not bad... > 60%...40% is out of no where; she does not appear dry; had U/S 7/20 per Dr Peralta=neg; I rec repeat in 3 mo in [place]---> 50% as of 4/21 OV is nice to see.  --  DM per endo=Dr Peralta...7.8 (1/15)...invokana instead of victoza now (no help januvia/invokana/elevated lipase with victoza)...8.5 with change to glipizide noted...7.8 is holding and I rec Lantus for tighter control if she does not want any treatment for progressive PVD...7.3 is good trend. (TSH neg 4/17)...6.4...7.7... 7.5 and needs better control given below=d/w Endo ? Lantus 15 qd...7.5 is stuck 4/20 = defer to Endo=they/ I am happy with this ballpark---> 7.3 in 10/20.  PROTEINURIA = 40 to 170 at 4/17 OV and will try to titrate ARB from 25 to 37 and then 50 if BP allows...103 is mild bump and ? related to A1C, so will repeat in 6 mo to ensure no rapid progression...247 and will increase to 75 mg now; call if dizzy; d/w needs tighter BS control; continue 6 mo f/u...good drop and no more room with BP anyhow---> better still 10/20 = ditto 7/3 in 4/21.  --  ANEMIA = down 13.2 to 11.4 past 2 years; rec MWF iron, cologuard, and call...stuck at 11.8 with lowish iron=d/w again to take q MWF=she only took it for a few weeks as of 4/20 OV...11.4 and I d/w again 10/20 that she should stay on the iron and take B12 for level 240=11.3 with same recs 4/21.  GERD at 4/18 OV and I rec pepcid q PM.  --  R LEG=11/19 twisted it, 12/19 = pain, 1/20 = pain but refused meds, 1/30/20 fell on face, 2/20 = knee xray neg, LS spine=spurs L1-4 and went to PT and they manipulated her hip and made it worse; I want her on gabapentin for the  spurs=pain down leg into knee, but she is focused on the knee, so will get MRI for her and take it from there---> had tear meniscus, did not like Dr Epps, she refused therapy, went to PT a month; no daily meds, occ tylenol.  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  --  OSTEOPENIA...BMD 7/12...spine -0.8, hip -1.4...12/17 = spine -1.3, hip -1.6; d/w po Ca+ = tums/etc---> BMD 10/20 = spine -0.4, hip - 1.5 = no tx needed.  VIT D DEF and needs tx now=50K to start...38 is stable---> 31.  --  --  BREAST CA s/p L lump '87.  MMG 9/15/21 at [facility] per me.   COLON 1/13...+polyp...q 5yrs per Dr Manzano...still no sx's, no bld...he rec no more even though had polyps...cologuard neg 9/19.  Pneumovax x 2; Zostavax '13; Prevnar '16; Shingrix x 2.  (, my pt Bruce, 3 kids with Dr Turcios=daughter; has place in [place] now and goes for 3 months q winter---> to go for 6 months '19). --  --    Follow Up   Return in about 7 months (around 5/8/2023).  Patient was given instructions and counseling regarding her condition or for health maintenance advice. Please see specific information pulled into the AVS if appropriate.

## 2022-10-20 NOTE — ASSESSMENT & PLAN NOTE
Blood pressure easily controlled as of 10/22 office visit.  Patient stable to continue moderate dose losartan and low-dose metoprolol.

## 2023-01-04 NOTE — PROGRESS NOTES
Chief Complaint      Colitis    History of Present Illness      Lucía Thomas is a 82 y.o. female who presents to [facility] as a new patient with history of colitis, left lower quadrant abdominal pain and a personal history of colon polyps.  Patient reports they were in [place] which is where they usually spend the winter and she began having back pain about 3 weeks ago.  She reports left-sided back pain that was radiating through the abdomen.  She reports being seen by her primary care provider in [place] they proceeded with a CT scan which displayed possible colitis.  She was treated with Cipro and Flagyl at that time for 14 days which she completed yesterday which did provide improvement.  She continues to have a small area in the left lower quadrant with a dull aching pain.  Patient reports bowel movements are normal.  In September they transitioned her off of metformin and onto Rybelsus which caused constipation they then transitioned her to Trulicity and her bowel movements returned to normal.  She reports during this medication transition she did have a day of black tarry stools which then resolved.  Patient denies fever, nausea, vomiting, weight loss, night sweats, hematochezia, hematemesis.    No colon/EGD on file: patient reported in 2011 with Dr. Manzano out of [place] with a small polyp removed.    Most recent labs on 10.07.2022 see below.  Results       Result Review :   The following data was reviewed by: AGUS Rodriguez on 01/06/2023     CMP    CMP 9/6/22 10/7/22 12/27/22   Glucose 155 (A) 143 (A)    BUN 11 15    Creatinine 1.63 (A) 1.62 (A)    eGFR 31.8 (A) 32.0 (A)    Sodium 137 139    Potassium 4.9 4.8    Chloride 102 104    Calcium 9.4 9.3    Total Protein 7.0 7.2 7.4   Albumin 4.40 4.30 4.8   Globulin 2.6 2.9    Total Bilirubin 0.4 0.9 0.4   Alkaline Phosphatase 76 75 87   AST (SGOT) 16 17 27   ALT (SGPT) 11 9 27   Albumin/Globulin Ratio 1.7 1.5     BUN/Creatinine Ratio 6.7 (A) 9.3    Anion Gap 10.2 8.6    (A) Abnormal value       Comments are available for some flowsheets but are not being displayed.           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    • Breast cancer ([facility])    • CAD (coronary artery disease)    • Chronic fatigue, unspecified    • Colon polyps    • Disorder of bone, unspecified    • DM (diabetes mellitus) ([facility])    • Essential (primary) hypertension    • Iron deficiency anemia, unspecified    • Mixed hyperlipidemia    • Peripheral vascular disease, unspecified ([facility])    • Pure hypercholesterolemia    • PVD (peripheral vascular disease) ([facility])    • Type 2 diabetes mellitus without complication ([facility])    • Vitamin D deficiency, unspecified        Past Surgical History:   Procedure Laterality Date   • BREAST LUMPECTOMY Left    • COLONOSCOPY  2011    Dr. guidry in [place]   • CORONARY ARTERY BYPASS GRAFT     • ECTOPIC PREGNANCY Right          Current Outpatient Medications:   •  Accu-Chek SmartView test strip, 2 (Two) Times a Day. test blood sugar, Disp: , Rfl:   •  Aspirin Low Dose 81 MG EC tablet, Take 81 mg by mouth Daily., Disp: , Rfl:   •  glipizide (GLUCOTROL) 10 MG tablet, Take 10 mg by mouth., Disp: , Rfl:   •  losartan (COZAAR) 50 MG tablet, TAKE one AND ONE-HALF tablet BY MOUTH EVERY DAY (Patient taking differently: 75 mg.), Disp: 135 tablet, Rfl: 1  •  metoprolol succinate XL (TOPROL-XL) 50 MG 24 hr tablet, TAKE ONE AND ONE-HALF TABLETS BY MOUTH ONCE DAILY (Patient taking differently: 75 mg.), Disp: 135 tablet, Rfl: 1  •  rosuvastatin (CRESTOR) 20 MG tablet, Take 20 mg by mouth Daily., Disp: , Rfl:   •   Trulicity 1.5 MG/0.5ML solution pen-injector, , Disp: , Rfl:   •  vitamin D (ERGOCALCIFEROL) 1.25 MG (56293 UT) capsule capsule, Take 1 capsule by mouth 1 (One) Time Per Week., Disp: 12 capsule, Rfl: 1  •  Sod Picosulfate-Mag Ox-Cit Acd (Clenpiq) 10-3.5-12 MG-GM -GM/160ML solution, Take 160 mL by mouth 1 (One) Time for 1 dose., Disp: 320 mL, Rfl: 0     No Known Allergies    Family History   Problem Relation Age of Onset   • Colon cancer Neg Hx         Social History     Social History Narrative   • Not on file       Objective       Objective     Vital Signs:   /60 (BP Location: Right arm, Patient Position: Sitting, Cuff Size: Adult)   Pulse 76   Ht 157.5 cm (62\")   Wt 59.4 kg (131 lb)   SpO2 100%   BMI 23.96 kg/m²     Body mass index is 23.96 kg/m².    Physical Exam  Constitutional:       General: She is not in acute distress.     Appearance: Normal appearance. She is well-developed and normal weight.   Eyes:      Conjunctiva/sclera: Conjunctivae normal.      Pupils: Pupils are equal, round, and reactive to light.      Visual Fields: Right eye visual fields normal and left eye visual fields normal.   Cardiovascular:      Rate and Rhythm: Normal rate and regular rhythm.      Heart sounds: Normal heart sounds.   Pulmonary:      Effort: Pulmonary effort is normal. No retractions.      Breath sounds: Normal breath sounds and air entry.      Comments: Inspection of chest: normal appearance  Abdominal:      General: Bowel sounds are normal.      Palpations: Abdomen is soft.      Tenderness: There is no abdominal tenderness.      Comments: No appreciable hepatosplenomegaly   Musculoskeletal:      Cervical back: Neck supple.      Right lower leg: No edema.      Left lower leg: No edema.   Lymphadenopathy:      Cervical: No cervical adenopathy.   Skin:     Findings: No lesion.      Comments: Turgor normal   Neurological:      Mental Status: She is alert and oriented to person, place, and time.   Psychiatric:          Mood and Affect: Mood and affect normal.              Assessment & Plan          Assessment and Plan    Diagnoses and all orders for this visit:    1. Left lower quadrant abdominal pain (Primary)    2. Colitis    3. Personal history of colonic polyps    Other orders  -     Sod Picosulfate-Mag Ox-Cit Acd (Clenpiq) 10-3.5-12 MG-GM -GM/160ML solution; Take 160 mL by mouth 1 (One) Time for 1 dose.  Dispense: 320 mL; Refill: 0      82-year-old female presenting the office today with a history of left lower quadrant abdominal pain, colitis and a personal history of colon polyps.  I have recommended that the patient undergo further evaluation with a colonoscopy.  I have discussed this procedure in detail with the patient.  I have discussed the risks, benefits and alternatives.  I have discussed the risk of anesthesia, bleeding and perforation.  Patient understands these risks, benefits and alternatives and wishes to proceed.  I will schedule her at her earliest convenience.            Follow Up       Follow Up   Return for Follow up after endoscopy in office.  Patient was given instructions and counseling regarding her condition or for health maintenance advice. Please see specific information pulled into the AVS if appropriate.

## 2023-01-04 NOTE — H&P (VIEW-ONLY)
Chief Complaint      Colitis    History of Present Illness      Lucía Thomas is a 82 y.o. female who presents to [facility] as a new patient with history of colitis, left lower quadrant abdominal pain and a personal history of colon polyps.  Patient reports they were in [place] which is where they usually spend the winter and she began having back pain about 3 weeks ago.  She reports left-sided back pain that was radiating through the abdomen.  She reports being seen by her primary care provider in [place] they proceeded with a CT scan which displayed possible colitis.  She was treated with Cipro and Flagyl at that time for 14 days which she completed yesterday which did provide improvement.  She continues to have a small area in the left lower quadrant with a dull aching pain.  Patient reports bowel movements are normal.  In September they transitioned her off of metformin and onto Rybelsus which caused constipation they then transitioned her to Trulicity and her bowel movements returned to normal.  She reports during this medication transition she did have a day of black tarry stools which then resolved.  Patient denies fever, nausea, vomiting, weight loss, night sweats, hematochezia, hematemesis.    No colon/EGD on file: patient reported in 2011 with Dr. Manzano out of [place] with a small polyp removed.    Most recent labs on 10.07.2022 see below.  Results       Result Review :   The following data was reviewed by: AGUS Rodriguez on 01/06/2023     CMP    CMP 9/6/22 10/7/22 12/27/22   Glucose 155 (A) 143 (A)    BUN 11 15    Creatinine 1.63 (A) 1.62 (A)    eGFR 31.8 (A) 32.0 (A)    Sodium 137 139    Potassium 4.9 4.8    Chloride 102 104    Calcium 9.4 9.3    Total Protein 7.0 7.2 7.4   Albumin 4.40 4.30 4.8   Globulin 2.6 2.9    Total Bilirubin 0.4 0.9 0.4   Alkaline Phosphatase 76 75 87   AST (SGOT) 16 17 27   ALT (SGPT) 11 9 27   Albumin/Globulin Ratio 1.7 1.5     BUN/Creatinine Ratio 6.7 (A) 9.3    Anion Gap 10.2 8.6    (A) Abnormal value       Comments are available for some flowsheets but are not being displayed.           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    • Breast cancer ([facility])    • CAD (coronary artery disease)    • Chronic fatigue, unspecified    • Colon polyps    • Disorder of bone, unspecified    • DM (diabetes mellitus) ([facility])    • Essential (primary) hypertension    • Iron deficiency anemia, unspecified    • Mixed hyperlipidemia    • Peripheral vascular disease, unspecified ([facility])    • Pure hypercholesterolemia    • PVD (peripheral vascular disease) ([facility])    • Type 2 diabetes mellitus without complication ([facility])    • Vitamin D deficiency, unspecified        Past Surgical History:   Procedure Laterality Date   • BREAST LUMPECTOMY Left    • COLONOSCOPY  2011    Dr. guidry in [place]   • CORONARY ARTERY BYPASS GRAFT     • ECTOPIC PREGNANCY Right          Current Outpatient Medications:   •  Accu-Chek SmartView test strip, 2 (Two) Times a Day. test blood sugar, Disp: , Rfl:   •  Aspirin Low Dose 81 MG EC tablet, Take 81 mg by mouth Daily., Disp: , Rfl:   •  glipizide (GLUCOTROL) 10 MG tablet, Take 10 mg by mouth., Disp: , Rfl:   •  losartan (COZAAR) 50 MG tablet, TAKE one AND ONE-HALF tablet BY MOUTH EVERY DAY (Patient taking differently: 75 mg.), Disp: 135 tablet, Rfl: 1  •  metoprolol succinate XL (TOPROL-XL) 50 MG 24 hr tablet, TAKE ONE AND ONE-HALF TABLETS BY MOUTH ONCE DAILY (Patient taking differently: 75 mg.), Disp: 135 tablet, Rfl: 1  •  rosuvastatin (CRESTOR) 20 MG tablet, Take 20 mg by mouth Daily., Disp: , Rfl:   •   "Trulicity 1.5 MG/0.5ML solution pen-injector, , Disp: , Rfl:   •  vitamin D (ERGOCALCIFEROL) 1.25 MG (07118 UT) capsule capsule, Take 1 capsule by mouth 1 (One) Time Per Week., Disp: 12 capsule, Rfl: 1  •  Sod Picosulfate-Mag Ox-Cit Acd (Clenpiq) 10-3.5-12 MG-GM -GM/160ML solution, Take 160 mL by mouth 1 (One) Time for 1 dose., Disp: 320 mL, Rfl: 0     No Known Allergies    Family History   Problem Relation Age of Onset   • Colon cancer Neg Hx         Social History     Social History Narrative   • Not on file       Objective       Objective     Vital Signs:   /60 (BP Location: Right arm, Patient Position: Sitting, Cuff Size: Adult)   Pulse 76   Ht 157.5 cm (62\")   Wt 59.4 kg (131 lb)   SpO2 100%   BMI 23.96 kg/m²     Body mass index is 23.96 kg/m².    Physical Exam  Constitutional:       General: She is not in acute distress.     Appearance: Normal appearance. She is well-developed and normal weight.   Eyes:      Conjunctiva/sclera: Conjunctivae normal.      Pupils: Pupils are equal, round, and reactive to light.      Visual Fields: Right eye visual fields normal and left eye visual fields normal.   Cardiovascular:      Rate and Rhythm: Normal rate and regular rhythm.      Heart sounds: Normal heart sounds.   Pulmonary:      Effort: Pulmonary effort is normal. No retractions.      Breath sounds: Normal breath sounds and air entry.      Comments: Inspection of chest: normal appearance  Abdominal:      General: Bowel sounds are normal.      Palpations: Abdomen is soft.      Tenderness: There is no abdominal tenderness.      Comments: No appreciable hepatosplenomegaly   Musculoskeletal:      Cervical back: Neck supple.      Right lower leg: No edema.      Left lower leg: No edema.   Lymphadenopathy:      Cervical: No cervical adenopathy.   Skin:     Findings: No lesion.      Comments: Turgor normal   Neurological:      Mental Status: She is alert and oriented to person, place, and time.   Psychiatric:       "   Mood and Affect: Mood and affect normal.              Assessment & Plan          Assessment and Plan    Diagnoses and all orders for this visit:    1. Left lower quadrant abdominal pain (Primary)    2. Colitis    3. Personal history of colonic polyps    Other orders  -     Sod Picosulfate-Mag Ox-Cit Acd (Clenpiq) 10-3.5-12 MG-GM -GM/160ML solution; Take 160 mL by mouth 1 (One) Time for 1 dose.  Dispense: 320 mL; Refill: 0      82-year-old female presenting the office today with a history of left lower quadrant abdominal pain, colitis and a personal history of colon polyps.  I have recommended that the patient undergo further evaluation with a colonoscopy.  I have discussed this procedure in detail with the patient.  I have discussed the risks, benefits and alternatives.  I have discussed the risk of anesthesia, bleeding and perforation.  Patient understands these risks, benefits and alternatives and wishes to proceed.  I will schedule her at her earliest convenience.            Follow Up       Follow Up   Return for Follow up after endoscopy in office.  Patient was given instructions and counseling regarding her condition or for health maintenance advice. Please see specific information pulled into the AVS if appropriate.

## 2023-01-06 ENCOUNTER — PREP FOR SURGERY (OUTPATIENT)
Dept: OTHER | Facility: HOSPITAL | Age: 83
End: 2023-01-06
Payer: MEDICARE

## 2023-01-06 ENCOUNTER — OFFICE VISIT (OUTPATIENT)
Dept: GASTROENTEROLOGY | Facility: CLINIC | Age: 83
End: 2023-01-06
Payer: MEDICARE

## 2023-01-06 VITALS
OXYGEN SATURATION: 100 % | SYSTOLIC BLOOD PRESSURE: 140 MMHG | WEIGHT: 131 LBS | BODY MASS INDEX: 24.11 KG/M2 | DIASTOLIC BLOOD PRESSURE: 60 MMHG | HEART RATE: 76 BPM | HEIGHT: 62 IN

## 2023-01-06 DIAGNOSIS — K52.9 COLITIS: ICD-10-CM

## 2023-01-06 DIAGNOSIS — Z86.010 PERSONAL HISTORY OF COLONIC POLYPS: ICD-10-CM

## 2023-01-06 DIAGNOSIS — R10.32 LEFT LOWER QUADRANT ABDOMINAL PAIN: Primary | ICD-10-CM

## 2023-01-06 PROBLEM — Z86.0100 PERSONAL HISTORY OF COLONIC POLYPS: Status: ACTIVE | Noted: 2023-01-06

## 2023-01-06 PROCEDURE — 99204 OFFICE O/P NEW MOD 45 MIN: CPT | Performed by: NURSE PRACTITIONER

## 2023-01-06 RX ORDER — METOPROLOL SUCCINATE 25 MG/1
75 TABLET, EXTENDED RELEASE ORAL DAILY
COMMUNITY
End: 2023-01-06

## 2023-01-06 RX ORDER — LOSARTAN POTASSIUM 25 MG/1
75 TABLET ORAL DAILY
COMMUNITY
End: 2023-01-06

## 2023-01-06 RX ORDER — GLIPIZIDE 10 MG/1
10 TABLET ORAL
COMMUNITY

## 2023-01-06 RX ORDER — SODIUM PICOSULFATE, MAGNESIUM OXIDE, AND ANHYDROUS CITRIC ACID 10; 3.5; 12 MG/160ML; G/160ML; G/160ML
160 LIQUID ORAL ONCE
Qty: 320 ML | Refills: 0 | Status: SHIPPED | OUTPATIENT
Start: 2023-01-06 | End: 2023-01-06

## 2023-01-06 RX ORDER — DULAGLUTIDE 1.5 MG/.5ML
INJECTION, SOLUTION SUBCUTANEOUS
COMMUNITY
Start: 2022-12-22

## 2023-01-09 ENCOUNTER — TELEPHONE (OUTPATIENT)
Dept: GASTROENTEROLOGY | Facility: CLINIC | Age: 83
End: 2023-01-09
Payer: MEDICARE

## 2023-01-09 RX ORDER — METOPROLOL SUCCINATE 50 MG/1
TABLET, EXTENDED RELEASE ORAL
Qty: 135 TABLET | Refills: 1 | Status: SHIPPED | OUTPATIENT
Start: 2023-01-09

## 2023-01-09 NOTE — TELEPHONE ENCOUNTER
I received a fax that Clenpiq is not covered by insurance from pt's pharmacy. I left pt a voicemail with my direct line as a call back number. In the message, I stated that the bowel prep needs to be changed due to insurance coverage and I would send in a new bowel prep. I did ask that pt return my call so we could go over instructions together. Awaiting a call back from pt.

## 2023-01-12 ENCOUNTER — TELEPHONE (OUTPATIENT)
Dept: GASTROENTEROLOGY | Facility: CLINIC | Age: 83
End: 2023-01-12
Payer: MEDICARE

## 2023-01-12 RX ORDER — SODIUM PICOSULFATE, MAGNESIUM OXIDE, AND ANHYDROUS CITRIC ACID 10; 3.5; 12 MG/160ML; G/160ML; G/160ML
160 LIQUID ORAL TAKE AS DIRECTED
Qty: 320 ML | Refills: 0 | COMMUNITY
Start: 2023-01-12

## 2023-01-18 ENCOUNTER — ANESTHESIA (OUTPATIENT)
Dept: GASTROENTEROLOGY | Facility: HOSPITAL | Age: 83
End: 2023-01-18
Payer: MEDICARE

## 2023-01-18 ENCOUNTER — HOSPITAL ENCOUNTER (OUTPATIENT)
Facility: HOSPITAL | Age: 83
Setting detail: HOSPITAL OUTPATIENT SURGERY
Discharge: HOME | End: 2023-01-18
Attending: INTERNAL MEDICINE | Admitting: INTERNAL MEDICINE
Payer: MEDICARE

## 2023-01-18 ENCOUNTER — TELEPHONE (OUTPATIENT)
Dept: GASTROENTEROLOGY | Facility: CLINIC | Age: 83
End: 2023-01-18
Payer: MEDICARE

## 2023-01-18 ENCOUNTER — ANESTHESIA EVENT (OUTPATIENT)
Dept: GASTROENTEROLOGY | Facility: HOSPITAL | Age: 83
End: 2023-01-18
Payer: MEDICARE

## 2023-01-18 VITALS
HEART RATE: 84 BPM | OXYGEN SATURATION: 99 % | DIASTOLIC BLOOD PRESSURE: 67 MMHG | SYSTOLIC BLOOD PRESSURE: 116 MMHG | WEIGHT: 127.87 LBS | BODY MASS INDEX: 23.39 KG/M2 | TEMPERATURE: 96.8 F | RESPIRATION RATE: 20 BRPM

## 2023-01-18 DIAGNOSIS — R10.32 LEFT LOWER QUADRANT ABDOMINAL PAIN: ICD-10-CM

## 2023-01-18 DIAGNOSIS — Z86.010 PERSONAL HISTORY OF COLONIC POLYPS: ICD-10-CM

## 2023-01-18 DIAGNOSIS — K52.9 COLITIS: ICD-10-CM

## 2023-01-18 LAB — GLUCOSE BLDC GLUCOMTR-MCNC: 177 MG/DL (ref 70–99)

## 2023-01-18 PROCEDURE — 25010000002 PROPOFOL 10 MG/ML EMULSION: Performed by: NURSE ANESTHETIST, CERTIFIED REGISTERED

## 2023-01-18 PROCEDURE — 82962 GLUCOSE BLOOD TEST: CPT

## 2023-01-18 PROCEDURE — 45385 COLONOSCOPY W/LESION REMOVAL: CPT | Performed by: INTERNAL MEDICINE

## 2023-01-18 PROCEDURE — 88305 TISSUE EXAM BY PATHOLOGIST: CPT | Performed by: INTERNAL MEDICINE

## 2023-01-18 RX ORDER — SODIUM CHLORIDE, SODIUM LACTATE, POTASSIUM CHLORIDE, CALCIUM CHLORIDE 600; 310; 30; 20 MG/100ML; MG/100ML; MG/100ML; MG/100ML
30 INJECTION, SOLUTION INTRAVENOUS CONTINUOUS
Status: DISCONTINUED | OUTPATIENT
Start: 2023-01-18 | End: 2023-01-18 | Stop reason: HOSPADM

## 2023-01-18 RX ORDER — SODIUM CHLORIDE 0.9 % (FLUSH) 0.9 %
10 SYRINGE (ML) INJECTION AS NEEDED
Status: DISCONTINUED | OUTPATIENT
Start: 2023-01-18 | End: 2023-01-18 | Stop reason: HOSPADM

## 2023-01-18 RX ORDER — SODIUM CHLORIDE, SODIUM LACTATE, POTASSIUM CHLORIDE, CALCIUM CHLORIDE 600; 310; 30; 20 MG/100ML; MG/100ML; MG/100ML; MG/100ML
1000 INJECTION, SOLUTION INTRAVENOUS CONTINUOUS
Status: DISCONTINUED | OUTPATIENT
Start: 2023-01-18 | End: 2023-01-18 | Stop reason: HOSPADM

## 2023-01-18 RX ORDER — LIDOCAINE HYDROCHLORIDE 20 MG/ML
INJECTION, SOLUTION EPIDURAL; INFILTRATION; INTRACAUDAL; PERINEURAL AS NEEDED
Status: DISCONTINUED | OUTPATIENT
Start: 2023-01-18 | End: 2023-01-18 | Stop reason: SURG

## 2023-01-18 RX ADMIN — LIDOCAINE HYDROCHLORIDE 100 MG: 20 INJECTION, SOLUTION EPIDURAL; INFILTRATION; INTRACAUDAL; PERINEURAL at 07:48

## 2023-01-18 RX ADMIN — SODIUM CHLORIDE, POTASSIUM CHLORIDE, SODIUM LACTATE AND CALCIUM CHLORIDE: 600; 310; 30; 20 INJECTION, SOLUTION INTRAVENOUS at 07:45

## 2023-01-18 RX ADMIN — PROPOFOL 200 MCG/KG/MIN: 10 INJECTION, EMULSION INTRAVENOUS at 07:48

## 2023-01-18 NOTE — TELEPHONE ENCOUNTER
Per Dr Andrews, pt to monitor and call the office if symptoms worsen. I spoke with patient. She is aware of Dr. Andrews's instructions.

## 2023-01-18 NOTE — TELEPHONE ENCOUNTER
Patient called the office stating she had a colonoscopy this morning. Patient has noticed blood in the toilet bowl, twice, since she has been home. Patient denies any fever or abdominal pain. Patient did have small polyps removed. I have messaged Dr. Andrews and patient is aware I will return her call. Patient is aware to go to the ER if she experiences any abdominal pain or has a fever. Pt agreed to this plan and is awaiting my call after speaking with Dr. Andrews.

## 2023-01-18 NOTE — ANESTHESIA PREPROCEDURE EVALUATION
Anesthesia Evaluation     Patient summary reviewed and Nursing notes reviewed   no history of anesthetic complications:  NPO Solid Status: > 8 hours  NPO Liquid Status: > 2 hours           Airway   Mallampati: III  TM distance: >3 FB  Neck ROM: full  No difficulty expected  Dental      Pulmonary - negative pulmonary ROS and normal exam    breath sounds clear to auscultation  Cardiovascular - normal exam  Exercise tolerance: good (4-7 METS)    Rhythm: regular  Rate: normal    (+) hypertension, CAD, PVD, hyperlipidemia,  carotid artery disease      Neuro/Psych- negative ROS  GI/Hepatic/Renal/Endo    (+)   renal disease, diabetes mellitus type 2,     Musculoskeletal (-) negative ROS    Abdominal    Substance History - negative use     OB/GYN negative ob/gyn ROS         Other      history of cancer    ROS/Med Hx Other: PAT Nursing Notes unavailable.                   Anesthesia Plan    ASA 3     general     (Total IV Anesthesia    Patient understands anesthesia not responsible for dental damage.  )  intravenous induction     Anesthetic plan, risks, benefits, and alternatives have been provided, discussed and informed consent has been obtained with: patient.    Plan discussed with CRNA.        CODE STATUS:

## 2023-01-18 NOTE — ANESTHESIA POSTPROCEDURE EVALUATION
Patient: Lucía Thomas    Procedure Summary     Date: 01/18/23 Room / Location: [facility] ENDOSCOPY 5 / [facility] ENDOSCOPY    Anesthesia Start: 0743 Anesthesia Stop: 0821    Procedure: COLONOSCOPY with cold snare polypectomy Diagnosis:       Left lower quadrant abdominal pain      Colitis      Personal history of colonic polyps      (Left lower quadrant abdominal pain [R10.32])      (Colitis [K52.9])      (Personal history of colonic polyps [Z86.010])    Surgeons: Mark Andrews MD Provider: Juan Alberto Alcocer MD    Anesthesia Type: general ASA Status: 3          Anesthesia Type: general    Vitals  Vitals Value Taken Time   BP 94/54 01/18/23 0821   Temp     Pulse 78 01/18/23 0822   Resp     SpO2 96 % 01/18/23 0822   Vitals shown include unvalidated device data.        Post Anesthesia Care and Evaluation    Patient location during evaluation: bedside  Patient participation: complete - patient participated  Level of consciousness: awake  Pain management: adequate    Airway patency: patent  Anesthetic complications: No anesthetic complications  PONV Status: none  Cardiovascular status: acceptable and stable  Respiratory status: acceptable  Hydration status: acceptable    Comments: An Anesthesiologist personally participated in the most demanding procedures (including induction and emergence if applicable) in the anesthesia plan, monitored the course of anesthesia administration at frequent intervals and remained physically present and available for immediate diagnosis and treatment of emergencies.

## 2023-01-19 ENCOUNTER — TELEPHONE (OUTPATIENT)
Dept: GASTROENTEROLOGY | Facility: CLINIC | Age: 83
End: 2023-01-19
Payer: MEDICARE

## 2023-01-19 LAB
CYTO UR: NORMAL
LAB AP CASE REPORT: NORMAL
LAB AP CLINICAL INFORMATION: NORMAL
PATH REPORT.FINAL DX SPEC: NORMAL
PATH REPORT.GROSS SPEC: NORMAL

## 2023-01-19 NOTE — TELEPHONE ENCOUNTER
----- Message from AGUS Rodriguez sent at 1/19/2023 11:02 AM EST -----  For small polyps 4 to 5 mm in size in the sigmoid colon removed.  Tubular adenoma on biopsy.  Repeat colonoscopy for screening is not recommended for surveillance.  No evidence of colitis or diverticulosis noted on colonoscopy.

## 2023-01-19 NOTE — TELEPHONE ENCOUNTER
Spoke to pt and informed of Sharmin GOLDSMITH result note and recommendations. Pt verified understanding. Pt does not report any issues or concerns. States symptoms reported to office on 1/19/2023 have subsided.

## 2023-02-20 RX ORDER — LOSARTAN POTASSIUM 50 MG/1
TABLET ORAL
Qty: 135 TABLET | Refills: 1 | Status: SHIPPED | OUTPATIENT
Start: 2023-02-20

## 2023-04-25 ENCOUNTER — LAB (OUTPATIENT)
Dept: LAB | Facility: HOSPITAL | Age: 83
End: 2023-04-25
Payer: MEDICARE

## 2023-04-25 ENCOUNTER — TRANSCRIBE ORDERS (OUTPATIENT)
Dept: LAB | Facility: HOSPITAL | Age: 83
End: 2023-04-25
Payer: MEDICARE

## 2023-04-25 DIAGNOSIS — I12.9 HYPERTENSIVE NEPHROPATHY: ICD-10-CM

## 2023-04-25 DIAGNOSIS — I10 PRIMARY HYPERTENSION: ICD-10-CM

## 2023-04-25 DIAGNOSIS — N18.32 STAGE 3B CHRONIC KIDNEY DISEASE: ICD-10-CM

## 2023-04-25 DIAGNOSIS — N18.30 STAGE 3 CHRONIC KIDNEY DISEASE, UNSPECIFIED WHETHER STAGE 3A OR 3B CKD: Primary | ICD-10-CM

## 2023-04-25 DIAGNOSIS — E55.9 VITAMIN D DEFICIENCY: ICD-10-CM

## 2023-04-25 DIAGNOSIS — R80.9 PROTEINURIA, UNSPECIFIED TYPE: ICD-10-CM

## 2023-04-25 DIAGNOSIS — E11.21 DIABETIC GLOMERULOPATHY: ICD-10-CM

## 2023-04-25 DIAGNOSIS — E11.21 DIABETIC GLOMERULOPATHY: Primary | ICD-10-CM

## 2023-04-25 DIAGNOSIS — E11.9 DIABETES MELLITUS WITHOUT COMPLICATION: ICD-10-CM

## 2023-04-25 DIAGNOSIS — E78.2 MIXED HYPERLIPIDEMIA: ICD-10-CM

## 2023-04-25 DIAGNOSIS — D53.1 MEGALOBLASTIC ANEMIA DUE TO VITAMIN B12 DEFICIENCY: ICD-10-CM

## 2023-04-25 DIAGNOSIS — N18.30 STAGE 3 CHRONIC KIDNEY DISEASE, UNSPECIFIED WHETHER STAGE 3A OR 3B CKD: ICD-10-CM

## 2023-04-25 LAB
25(OH)D3 SERPL-MCNC: 45.8 NG/ML (ref 30–100)
ALBUMIN SERPL-MCNC: 4.6 G/DL (ref 3.5–5.2)
ALBUMIN UR-MCNC: 4 MG/DL
ALBUMIN/GLOB SERPL: 1.5 G/DL
ALP SERPL-CCNC: 76 U/L (ref 39–117)
ALT SERPL W P-5'-P-CCNC: 21 U/L (ref 1–33)
ANION GAP SERPL CALCULATED.3IONS-SCNC: 8.1 MMOL/L (ref 5–15)
AST SERPL-CCNC: 20 U/L (ref 1–32)
BASOPHILS # BLD AUTO: 0.04 10*3/MM3 (ref 0–0.2)
BASOPHILS NFR BLD AUTO: 0.7 % (ref 0–1.5)
BILIRUB SERPL-MCNC: 0.8 MG/DL (ref 0–1.2)
BUN SERPL-MCNC: 12 MG/DL (ref 8–23)
BUN/CREAT SERPL: 7 (ref 7–25)
CALCIUM SPEC-SCNC: 10.3 MG/DL (ref 8.6–10.5)
CHLORIDE SERPL-SCNC: 106 MMOL/L (ref 98–107)
CHOLEST SERPL-MCNC: 162 MG/DL (ref 0–200)
CO2 SERPL-SCNC: 24.9 MMOL/L (ref 22–29)
CREAT SERPL-MCNC: 1.72 MG/DL (ref 0.57–1)
CREAT UR-MCNC: 31 MG/DL
DEPRECATED RDW RBC AUTO: 43.7 FL (ref 37–54)
EGFRCR SERPLBLD CKD-EPI 2021: 29.6 ML/MIN/1.73
EOSINOPHIL # BLD AUTO: 0.34 10*3/MM3 (ref 0–0.4)
EOSINOPHIL NFR BLD AUTO: 5.6 % (ref 0.3–6.2)
ERYTHROCYTE [DISTWIDTH] IN BLOOD BY AUTOMATED COUNT: 13.5 % (ref 12.3–15.4)
FOLATE SERPL-MCNC: >20 NG/ML (ref 4.78–24.2)
GLOBULIN UR ELPH-MCNC: 3 GM/DL
GLUCOSE SERPL-MCNC: 133 MG/DL (ref 65–99)
HBA1C MFR BLD: 8.4 % (ref 4.8–5.6)
HCT VFR BLD AUTO: 38.9 % (ref 34–46.6)
HDLC SERPL-MCNC: 43 MG/DL (ref 40–60)
HGB BLD-MCNC: 12.9 G/DL (ref 12–15.9)
IMM GRANULOCYTES # BLD AUTO: 0.01 10*3/MM3 (ref 0–0.05)
IMM GRANULOCYTES NFR BLD AUTO: 0.2 % (ref 0–0.5)
LDLC SERPL CALC-MCNC: 83 MG/DL (ref 0–100)
LDLC/HDLC SERPL: 1.78 {RATIO}
LYMPHOCYTES # BLD AUTO: 1.47 10*3/MM3 (ref 0.7–3.1)
LYMPHOCYTES NFR BLD AUTO: 24.1 % (ref 19.6–45.3)
MCH RBC QN AUTO: 29.6 PG (ref 26.6–33)
MCHC RBC AUTO-ENTMCNC: 33.2 G/DL (ref 31.5–35.7)
MCV RBC AUTO: 89.2 FL (ref 79–97)
MICROALBUMIN/CREAT UR: 129 MG/G
MONOCYTES # BLD AUTO: 0.46 10*3/MM3 (ref 0.1–0.9)
MONOCYTES NFR BLD AUTO: 7.6 % (ref 5–12)
NEUTROPHILS NFR BLD AUTO: 3.77 10*3/MM3 (ref 1.7–7)
NEUTROPHILS NFR BLD AUTO: 61.8 % (ref 42.7–76)
NRBC BLD AUTO-RTO: 0 /100 WBC (ref 0–0.2)
PHOSPHATE SERPL-MCNC: 3.9 MG/DL (ref 2.5–4.5)
PLATELET # BLD AUTO: 228 10*3/MM3 (ref 140–450)
PMV BLD AUTO: 10.9 FL (ref 6–12)
POTASSIUM SERPL-SCNC: 5.1 MMOL/L (ref 3.5–5.2)
PROT SERPL-MCNC: 7.6 G/DL (ref 6–8.5)
PTH-INTACT SERPL-MCNC: 74.9 PG/ML (ref 15–65)
RBC # BLD AUTO: 4.36 10*6/MM3 (ref 3.77–5.28)
SODIUM SERPL-SCNC: 139 MMOL/L (ref 136–145)
TRIGL SERPL-MCNC: 213 MG/DL (ref 0–150)
TSH SERPL DL<=0.05 MIU/L-ACNC: 2.68 UIU/ML (ref 0.27–4.2)
URATE SERPL-MCNC: 4.6 MG/DL (ref 2.4–5.7)
VIT B12 BLD-MCNC: 499 PG/ML (ref 211–946)
VLDLC SERPL-MCNC: 36 MG/DL (ref 5–40)
WBC NRBC COR # BLD: 6.09 10*3/MM3 (ref 3.4–10.8)

## 2023-04-25 PROCEDURE — 82746 ASSAY OF FOLIC ACID SERUM: CPT

## 2023-04-25 PROCEDURE — 36415 COLL VENOUS BLD VENIPUNCTURE: CPT

## 2023-04-25 PROCEDURE — 84100 ASSAY OF PHOSPHORUS: CPT

## 2023-04-25 PROCEDURE — 84550 ASSAY OF BLOOD/URIC ACID: CPT

## 2023-04-25 PROCEDURE — 82570 ASSAY OF URINE CREATININE: CPT

## 2023-04-25 PROCEDURE — 85025 COMPLETE CBC W/AUTO DIFF WBC: CPT

## 2023-04-25 PROCEDURE — 83036 HEMOGLOBIN GLYCOSYLATED A1C: CPT

## 2023-04-25 PROCEDURE — 82306 VITAMIN D 25 HYDROXY: CPT

## 2023-04-25 PROCEDURE — 80053 COMPREHEN METABOLIC PANEL: CPT

## 2023-04-25 PROCEDURE — 80061 LIPID PANEL: CPT

## 2023-04-25 PROCEDURE — 82043 UR ALBUMIN QUANTITATIVE: CPT

## 2023-04-25 PROCEDURE — 83970 ASSAY OF PARATHORMONE: CPT

## 2023-04-25 PROCEDURE — 82607 VITAMIN B-12: CPT

## 2023-04-25 PROCEDURE — 84443 ASSAY THYROID STIM HORMONE: CPT

## 2023-05-07 PROBLEM — Z00.00 MEDICARE ANNUAL WELLNESS VISIT, SUBSEQUENT: Status: ACTIVE | Noted: 2023-05-07

## 2023-05-08 NOTE — PROGRESS NOTES
"Chief Complaint  Hyperlipidemia and Follow-up (Pt states that this is routine, she had labs. /She states that her A1C is a little high, but ENDO is working on this. )    Subjective          Lucía Thomas presents to [facility]     History of Present Illness  Patient very pleasant 81-year-old female with underlying hypertension, hyperlipidemia, resultant coronary artery disease prior CABG x2 with no MI, as well as underlying diabetes, who is coming in 5/23 for routine 6-month follow-up.  We will review her labs and make further recommendations at that time.    Review of Systems   Constitutional: Negative for appetite change, fatigue and fever.   HENT: Negative for congestion and ear pain.    Eyes: Negative for blurred vision.   Respiratory: Negative for cough, chest tightness, shortness of breath and wheezing.    Cardiovascular: Negative for chest pain, palpitations and leg swelling.   Gastrointestinal: Negative for abdominal pain.   Genitourinary: Negative for difficulty urinating, dysuria and hematuria.   Musculoskeletal: Negative for arthralgias and gait problem.   Skin: Negative for skin lesions.   Neurological: Negative for syncope, memory problem and confusion.   Psychiatric/Behavioral: Negative for self-injury and depressed mood.       Objective   Vital Signs:   /52   Pulse 69   Temp 97.3 °F (36.3 °C) (Skin)   Ht 157.5 cm (62.01\")   Wt 59.6 kg (131 lb 6.4 oz)   SpO2 99%   BMI 24.03 kg/m²           Physical Exam  Vitals and nursing note reviewed.   Constitutional:       General: She is not in acute distress.     Appearance: Normal appearance. She is not toxic-appearing.   HENT:      Head: Atraumatic.      Right Ear: External ear normal.      Left Ear: External ear normal.      Nose: Nose normal.      Mouth/Throat:      Mouth: Mucous membranes are moist.   Eyes:      General:         Right eye: No discharge.         Left eye: No discharge.      Extraocular " Movements: Extraocular movements intact.      Pupils: Pupils are equal, round, and reactive to light.   Cardiovascular:      Rate and Rhythm: Normal rate and regular rhythm.      Pulses: Normal pulses.      Heart sounds: Normal heart sounds. No murmur heard.    No gallop.   Pulmonary:      Effort: Pulmonary effort is normal. No respiratory distress.      Breath sounds: No wheezing, rhonchi or rales.   Abdominal:      General: There is no distension.      Palpations: Abdomen is soft. There is no mass.      Tenderness: There is no abdominal tenderness. There is no guarding.   Musculoskeletal:         General: No swelling or tenderness.      Cervical back: No tenderness.      Right lower leg: No edema.      Left lower leg: No edema.   Skin:     General: Skin is warm and dry.      Findings: No rash.   Neurological:      General: No focal deficit present.      Mental Status: She is alert and oriented to person, place, and time. Mental status is at baseline.      Motor: No weakness.      Gait: Gait normal.   Psychiatric:         Mood and Affect: Mood normal.         Thought Content: Thought content normal.          Result Review :   The following data was reviewed by: Juan Alberto Genao MD on 10/19/2021:                  Assessment and Plan    Diagnoses and all orders for this visit:    1. Medicare annual wellness visit, subsequent (Primary)  Assessment & Plan:  AWV completed 5/23.  Patient remains active and independent  No falls, no hospitalizations past year.  Does not have advanced directive, however she does have one of her children designated as her medical POA.      2. Type 2 diabetes mellitus without complication, with long-term current use of insulin  Assessment & Plan:  A1c unfortunately back up from 7.6 to 8.4 as of 5/23.  Patient's morning sugars are generally in the 120 ballpark, but she can jump up to 200+ during the day.  Her Trulicity was titrated from 1.5 to 3 mg by endocrinology recently. She will also add  Farxgia given to her by Renal.      Orders:  -     Fructosamine; Future  -     Hemoglobin A1c; Future  -     Hemoglobin A1c; Future    3. Stage 3b chronic kidney disease  Assessment & Plan:  Patient's GFR has trended down slightly to 29 as of 5/23.  Potassium is up a little bit as well to 5.1.  She saw Dr. Garcia recently him he discussed with her SGLT-2 inhibitors and also gave her the option to lower the losartan to see if she is having any adverse effect from it presently.  Given excellent blood pressure, the patient chose to back off some on losartan, however given the elevated A1c presently, I recommend she give Farxgia a try instead.  This would hopefully kill 2 birds with 1 stone.  If she has early and repeated urine infections, then obviously this would not be the agent for her.    Orders:  -     Renal Function Panel; Future  -     Comprehensive Metabolic Panel; Future    4. Mixed hyperlipidemia  Assessment & Plan:  After being around 50 several times in a row, her LDL jumped to 106 in 10/22.  She is down to 83 as of 5/23, so I think were fine to continue with just moderate dose Crestor.  She saw cardiology here recently and they did not have an issue with this current level either.    Orders:  -     Lipid Panel; Future    5. Primary hypertension  Assessment & Plan:  Blood pressure ambrosio well controlled as of 5/23 office visit.  Patient will continue with moderate dose losartan and low-dose metoprolol.      6. Vitamin D deficiency  Assessment & Plan:  Vitamin D level is stable around 45 as of 5/23.  Patient will continue with once weekly prescription strength vitamin D.      7. Megaloblastic anemia due to vitamin B12 deficiency  Assessment & Plan:  B12 is up from about 200 to 500 as of her 5/23 office visit.  She is not anemic and her MCV is normal presently.  Continue current supplementation      8. Osteopenia, unspecified location    9. Breast cancer screening by mammogram  -     Mammo Screening Digital  Tomosynthesis Bilateral With CAD; Future    10. Postmenopausal bone loss  -     DEXA Bone Density Axial; Future    Other orders  -     vitamin D (ERGOCALCIFEROL) 1.25 MG (33447 UT) capsule capsule; Take 1 capsule by mouth 1 (One) Time Per Week.  Dispense: 12 capsule; Refill: 1     --  --  VISIT 4/21:  ANNUAL MEDICARE WELLNESS EXAM 4/21 =reviewed all forms with pt in office; no new discoveries; Joseph neg.  --  CAD/CABG 2V '04...s/p cath 4/12...per Dr Sherman; ECHO with EF 60%...10/19 has no ischemia with treadmill up to 2 miles qd or gets outside; cards is only going to see PRN as of 4/19 OV=stress ONLY if sxs---> no sxs 10/20 and saw Dr Lopez recently=stable carotids/neg holter/ECHO stable.    PVD with R carotid lesion 60-79% '09...stable 9/14... 80-90% and has f/u with Dr MARANDA Peralta for this...CTA = 75% and he will follow with dopplers since she doesn't want surgery...was stable/? better 10/17 per Dr Sabino Peralta with f/u later this fall '18...she doesn't want it followed anymore because she is against any surgery unless sxs/etc; is c/w ASA=we d/w this 4/20.    LIPIDS per endo with LDL 64 (9/16)...34 is same 4/20---> 54 is goal 4/21.  --  HTN remains well controlled.  CKD3 = 50% in 10/19 is not bad... > 60%...40% is out of no where; she does not appear dry; had U/S 7/20 per Dr Peralta=neg; I rec repeat in 3 mo in [place]---> 50% as of 4/21 OV is nice to see.  --  DM per endo=Dr Peralta...7.8 (1/15)...invokana instead of victoza now (no help januvia/invokana/elevated lipase with victoza)...8.5 with change to glipizide noted...7.8 is holding and I rec Lantus for tighter control if she does not want any treatment for progressive PVD...7.3 is good trend. (TSH neg 4/17)...6.4...7.7... 7.5 and needs better control given below=d/w Endo ? Lantus 15 qd...7.5 is stuck 4/20 = defer to Endo=they/ I am happy with this ballpark---> 7.3 in 10/20.  PROTEINURIA = 40 to 170 at 4/17 OV and will try to titrate ARB from 25 to 37 and then 50 if  BP allows...103 is mild bump and ? related to A1C, so will repeat in 6 mo to ensure no rapid progression...247 and will increase to 75 mg now; call if dizzy; d/w needs tighter BS control; continue 6 mo f/u...good drop and no more room with BP anyhow---> better still 10/20 = ditto 7/3 in 4/21.  --  ANEMIA = down 13.2 to 11.4 past 2 years; rec MWF iron, cologuard, and call...stuck at 11.8 with lowish iron=d/w again to take q MWF=she only took it for a few weeks as of 4/20 OV...11.4 and I d/w again 10/20 that she should stay on the iron and take B12 for level 240=11.3 with same recs 4/21.  GERD at 4/18 OV and I rec pepcid q PM.  --  R LEG=11/19 twisted it, 12/19 = pain, 1/20 = pain but refused meds, 1/30/20 fell on face, 2/20 = knee xray neg, LS spine=spurs L1-4 and went to PT and they manipulated her hip and made it worse; I want her on gabapentin for the spurs=pain down leg into knee, but she is focused on the knee, so will get MRI for her and take it from there---> had tear meniscus, did not like Dr Epps, she refused therapy, went to PT a month; no daily meds, occ tylenol.  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  --  OSTEOPENIA...BMD 7/12...spine -0.8, hip -1.4...12/17 = spine -1.3, hip -1.6; d/w po Ca+ = tums/etc---> BMD 10/20 = spine -0.4, hip - 1.5 = no tx needed.  VIT D DEF and needs tx now=50K to start...38 is stable---> 31.  --  --  BREAST CA s/p L lump '87.  MMG 9/15/21 at [facility] per me.   COLON 1/22 = +polyp = no more per Dr Andrews.  Pneumovax x 2; Zostavax '13; Prevnar '16; Shingrix x 2.  (, my pt Bruce, 3 kids with Dr Turcios=daughter; has place in [place] now and goes for 3 months q winter---> to go for 6 months '19). --  --    Follow Up   Return in about 6 months (around 11/9/2023).  Patient was given instructions and counseling regarding her condition or for health maintenance advice. Please see specific information pulled into the  AVS if appropriate.

## 2023-05-09 ENCOUNTER — OFFICE VISIT (OUTPATIENT)
Dept: INTERNAL MEDICINE | Facility: CLINIC | Age: 83
End: 2023-05-09
Payer: MEDICARE

## 2023-05-09 VITALS
BODY MASS INDEX: 24.18 KG/M2 | HEIGHT: 62 IN | HEART RATE: 69 BPM | WEIGHT: 131.4 LBS | DIASTOLIC BLOOD PRESSURE: 52 MMHG | TEMPERATURE: 97.3 F | OXYGEN SATURATION: 99 % | SYSTOLIC BLOOD PRESSURE: 110 MMHG

## 2023-05-09 DIAGNOSIS — D53.1 MEGALOBLASTIC ANEMIA DUE TO VITAMIN B12 DEFICIENCY: ICD-10-CM

## 2023-05-09 DIAGNOSIS — Z12.31 BREAST CANCER SCREENING BY MAMMOGRAM: ICD-10-CM

## 2023-05-09 DIAGNOSIS — N18.32 STAGE 3B CHRONIC KIDNEY DISEASE: ICD-10-CM

## 2023-05-09 DIAGNOSIS — M81.0 POSTMENOPAUSAL BONE LOSS: ICD-10-CM

## 2023-05-09 DIAGNOSIS — Z79.4 TYPE 2 DIABETES MELLITUS WITHOUT COMPLICATION, WITH LONG-TERM CURRENT USE OF INSULIN: ICD-10-CM

## 2023-05-09 DIAGNOSIS — E78.2 MIXED HYPERLIPIDEMIA: ICD-10-CM

## 2023-05-09 DIAGNOSIS — M85.80 OSTEOPENIA, UNSPECIFIED LOCATION: ICD-10-CM

## 2023-05-09 DIAGNOSIS — E55.9 VITAMIN D DEFICIENCY: ICD-10-CM

## 2023-05-09 DIAGNOSIS — E11.9 TYPE 2 DIABETES MELLITUS WITHOUT COMPLICATION, WITH LONG-TERM CURRENT USE OF INSULIN: ICD-10-CM

## 2023-05-09 DIAGNOSIS — I10 PRIMARY HYPERTENSION: ICD-10-CM

## 2023-05-09 DIAGNOSIS — Z00.00 MEDICARE ANNUAL WELLNESS VISIT, SUBSEQUENT: Primary | ICD-10-CM

## 2023-05-09 RX ORDER — ERGOCALCIFEROL 1.25 MG/1
50000 CAPSULE ORAL WEEKLY
Qty: 12 CAPSULE | Refills: 1 | Status: SHIPPED | OUTPATIENT
Start: 2023-05-09

## 2023-05-09 RX ORDER — DULAGLUTIDE 3 MG/.5ML
INJECTION, SOLUTION SUBCUTANEOUS
COMMUNITY
Start: 2023-05-01

## 2023-05-09 NOTE — ASSESSMENT & PLAN NOTE
A1c unfortunately back up from 7.6 to 8.4 as of 5/23.  Patient's morning sugars are generally in the 120 ballpark, but she can jump up to 200+ during the day.  Her Trulicity was titrated from 1.5 to 3 mg by endocrinology recently. She will also add Farxgia given to her by Renal.

## 2023-05-09 NOTE — ASSESSMENT & PLAN NOTE
DENEEN completed 5/23.  Patient remains active and independent  No falls, no hospitalizations past year.  Does not have advanced directive, however she does have one of her children designated as her medical POA.

## 2023-05-09 NOTE — ASSESSMENT & PLAN NOTE
Patient's GFR has trended down slightly to 29 as of 5/23.  Potassium is up a little bit as well to 5.1.  She saw Dr. Garcia recently him he discussed with her SGLT-2 inhibitors and also gave her the option to lower the losartan to see if she is having any adverse effect from it presently.  Given excellent blood pressure, the patient chose to back off some on losartan, however given the elevated A1c presently, I recommend she give Farxgia a try instead.  This would hopefully kill 2 birds with 1 stone.  If she has early and repeated urine infections, then obviously this would not be the agent for her.

## 2023-05-09 NOTE — ASSESSMENT & PLAN NOTE
Blood pressure ambrosio well controlled as of 5/23 office visit.  Patient will continue with moderate dose losartan and low-dose metoprolol.

## 2023-05-09 NOTE — PATIENT INSTRUCTIONS
Potassium Content of Foods  Potassium is a mineral found in many foods and drinks. It can affect how the heart works, affect blood pressure, and keep fluids and electrolytes balanced in the body. It is important not to have too much potassium (hyperkalemia) or too little potassium (hypokalemia) in the body, especially in the blood.  Potassium is naturally found in many different types of whole foods, such as fruits, vegetables, meat, and dairy products. Processed foods tend to be lower in potassium. The amount of potassium you need each day depends on your age and any medical conditions you may have. General recommendations are:  Females aged 19 and older: 2,600 mg per day.  Males aged 19 and older: 3,400 mg per day.  Talk with your health care provider or dietitian about how much potassium you need.  What foods are high in potassium?  Below are examples of foods that have greater than 200 mg of potassium per serving.  Fruits  Orange -- 1 medium (130 g) has 230 mg of potassium.  Banana -- 1 medium (120 g) has 420 mg of potassium.  Cantaloupe, chunks -- 1 cup (160 g) has 430 mg of potassium.  Vegetables  Potato, baked, without skin -- 1 medium (170 g) has 600 mg of potassium.  Broccoli, chopped, cooked -- ½ cup (77.5 g) has 230 mg of potassium.  Tomato, chopped or sliced -- 1 cup (152 g) has 400 mg of potassium.  Grains  Cereal, bran with raisins -- 1 cup (59 g) has 360 mg of potassium.  Granola with almonds -- ½ cup (82 g) has 220 mg of potassium.  Meats and other proteins  Ground beef jolanta -- 4 ounces (113 g) has 240 mg of potassium.  Kidney beans, boiled -- ½ cup (130 g) has 350 mg of potassium.  Almonds -- 1 ounce (approximately 22 nuts or 28 g) has 200 mg of potassium.  Dairy  Cow's milk, 1% -- 1 cup (237 mL) has 360 mg of potassium.  Plain vanilla low-fat yogurt -- ¾ cup (184 g) has 220 mg of potassium.  The items listed above may not be a complete list of foods high in potassium. Actual amounts of potassium  may be different depending on ripeness, shelf life, and food preparation. Contact a dietitian for more information.  What foods are low in potassium?  Below are examples of foods that have less than 200 mg of potassium per serving.  Fruits  Blueberries -- 1 cup (145 g) has 110 mg of potassium.  Apple -- 1 medium (140 g) has 145 mg of potassium.  Grapes -- 1 cup (160 g) has 175 mg of potassium.  Vegetables  Cabbage, raw -- 1 cup (70 g) has 120 mg of potassium.  Cauliflower, chopped, cooked -- 1 cup (180 g) has 90 mg of potassium.  Herrera lettuce, chopped -- 1 cup (56 g) has 120 mg of potassium.  Grains  Bagel, plain -- one 4-inch (10 cm) has 100 mg of potassium.  Whole wheat bread -- 1 slice (26 g) has 70 mg of potassium.  White rice, cooked -- 1 cup (163 g) has 50 mg of potassium.  Meats and other proteins  Tuna, light, canned in water -- 3 ounces (85 g) has 150 mg of potassium.  Egg, fried -- 1 large (50 g) has 60 mg of potassium.  Peanuts --1 ounce (35 nuts or 28 g) has 180 mg of potassium.  Tofu -- ½ cup (252 g) has 150 mg of potassium.  Dairy  Cheese (cheddar, gemma, mozzarella, or provolone) -- 1 ounce (28 g) has 30 to 40 mg of potassium.  The items listed above may not be a complete list of foods that are low in potassium. Actual amounts of potassium may be different depending on ripeness, shelf life, and food preparation. Contact a dietitian for more information.  Summary  Potassium is a mineral found in many foods and drinks. It affects how the heart works, affects blood pressure, and keeps fluids and electrolytes balanced in the body.  The amount of potassium you need each day depends on your age and any existing medical conditions you may have.  Your health care provider or dietitian may recommend an amount of potassium that you should have each day.  This information is not intended to replace advice given to you by your health care provider. Make sure you discuss any questions you have with your health  care provider.  Document Revised: 09/20/2022 Document Reviewed: 09/01/2022  Elsevier Patient Education © 2022 [employer]

## 2023-05-09 NOTE — ASSESSMENT & PLAN NOTE
Vitamin D level is stable around 45 as of 5/23.  Patient will continue with once weekly prescription strength vitamin D.

## 2023-05-09 NOTE — PROGRESS NOTES
The ABCs of the Annual Wellness Visit  Subsequent Medicare Wellness Visit    Subjective      Lucía Thomas is a 82 y.o. female who presents for a Subsequent Medicare Wellness Visit.    The following portions of the patient's history were reviewed and   updated as appropriate: allergies, current medications, past family history, past medical history, past social history, past surgical history and problem list.    Compared to one year ago, the patient feels her physical   health is the same.    Compared to one year ago, the patient feels her mental   health is the same.    Recent Hospitalizations:  She was not admitted to the hospital during the last year.       Current Medical Providers:  Patient Care Team:  Juan Alberto Genao MD as PCP - General (Internal Medicine)    Outpatient Medications Prior to Visit   Medication Sig Dispense Refill   • Accu-Chek SmartView test strip 2 (Two) Times a Day. test blood sugar     • Aspirin Low Dose 81 MG EC tablet Take 1 tablet by mouth Every Evening.     • glipizide (GLUCOTROL) 10 MG tablet Take 1 tablet by mouth 2 (Two) Times a Day Before Meals.     • losartan (COZAAR) 50 MG tablet TAKE one AND ONE-HALF tablet BY MOUTH EVERY  tablet 1   • metoprolol succinate XL (TOPROL-XL) 50 MG 24 hr tablet TAKE ONE AND ONE-HALF tablets BY MOUTH EVERY DAY (Patient taking differently: 1 tablet Every Evening.) 135 tablet 1   • rosuvastatin (CRESTOR) 20 MG tablet Take 1 tablet by mouth Daily.     • Sod Picosulfate-Mag Ox-Cit Acd (Clenpiq) 10-3.5-12 MG-GM -GM/160ML solution Take 160 mL by mouth Take As Directed. 320 mL 0   • Trulicity 3 MG/0.5ML solution pen-injector Inject 3 mg under the skin every 7 (seven) days.     • vitamin D (ERGOCALCIFEROL) 1.25 MG (11354 UT) capsule capsule Take 1 capsule by mouth 1 (One) Time Per Week. (Patient taking differently: Take 1 capsule by mouth 1 (One) Time Per Week. occasionally) 12 capsule 1   • Trulicity 1.5 MG/0.5ML solution pen-injector Every 7  "(Seven) Days.       No facility-administered medications prior to visit.       No opioid medication identified on active medication list. I have reviewed chart for other potential  high risk medication/s and harmful drug interactions in the elderly.          Aspirin is on active medication list. Aspirin use is indicated based on review of current medical condition/s. Pros and cons of this therapy have been discussed today. Benefits of this medication outweigh potential harm.  Patient has been encouraged to continue taking this medication.  .      Patient Active Problem List   Diagnosis   • IHD (ischemic heart disease)   • Megaloblastic anemia due to vitamin B12 deficiency   • Type 2 diabetes mellitus without complication, with long-term current use of insulin   • Primary hypertension   • Mixed hyperlipidemia   • Vitamin D deficiency   • Atherosclerosis of coronary artery bypass graft   • Stenosis of right carotid artery   • Stage 3b chronic kidney disease   • Left lower quadrant abdominal pain   • Colitis   • Personal history of colonic polyps   • Medicare annual wellness visit, subsequent     Advance Care Planning   Advance Care Planning     Advance Directive is not on file.  ACP discussion was held with the patient during this visit. Patient does not have an advance directive, information provided.     Objective    Vitals:    05/09/23 0927   BP: 110/52   Pulse: 69   Temp: 97.3 °F (36.3 °C)   TempSrc: Skin   SpO2: 99%   Weight: 59.6 kg (131 lb 6.4 oz)   Height: 157.5 cm (62.01\")     Estimated body mass index is 24.03 kg/m² as calculated from the following:    Height as of this encounter: 157.5 cm (62.01\").    Weight as of this encounter: 59.6 kg (131 lb 6.4 oz).    BMI is within normal parameters. No other follow-up for BMI required.      Does the patient have evidence of cognitive impairment?   No    Lab Results   Component Value Date    TRIG 213 (H) 04/25/2023    HDL 43 04/25/2023    LDL 83 04/25/2023    VLDL 36 " 2023    HGBA1C 8.40 (H) 2023          HEALTH RISK ASSESSMENT    Smoking Status:  Social History     Tobacco Use   Smoking Status Never   Smokeless Tobacco Never     Alcohol Consumption:  Social History     Substance and Sexual Activity   Alcohol Use Never     Fall Risk Screen:    CHIVO Fall Risk Assessment was completed, and patient is at LOW risk for falls.Assessment completed on:2023    Depression Screenin/9/2023     9:30 AM   PHQ-2/PHQ-9 Depression Screening   Little Interest or Pleasure in Doing Things 0-->not at all   Feeling Down, Depressed or Hopeless 0-->not at all   PHQ-9: Brief Depression Severity Measure Score 0       Health Habits and Functional and Cognitive Screenin/9/2023     9:00 AM   Functional & Cognitive Status   Do you have difficulty preparing food and eating? No   Do you have difficulty bathing yourself, getting dressed or grooming yourself? No   Do you have difficulty using the toilet? No   Do you have difficulty moving around from place to place? No   Do you have trouble with steps or getting out of a bed or a chair? No   Current Diet Well Balanced Diet   Do you need help using the phone?  No   Are you deaf or do you have serious difficulty hearing?  No   Do you need help with transportation? No   Do you need help shopping? No   Do you need help preparing meals?  No   Do you need help with housework?  No   Do you need help with laundry? No   Do you need help taking your medications? No   Do you need help managing money? No   Do you ever drive or ride in a car without wearing a seat belt? No   Do you have difficulty concentrating, remembering or making decisions? No       Age-appropriate Screening Schedule:  Refer to the list below for future screening recommendations based on patient's age, sex and/or medical conditions. Orders for these recommended tests are listed in the plan section. The patient has been provided with a written plan.    Health Maintenance    Topic Date Due   • TDAP/TD VACCINES (1 - Tdap) Never done   • ANNUAL WELLNESS VISIT  Never done   • DXA SCAN  11/10/2022   • DIABETIC EYE EXAM  07/01/2023   • INFLUENZA VACCINE  08/01/2023   • HEMOGLOBIN A1C  10/25/2023   • LIPID PANEL  04/25/2024   • URINE MICROALBUMIN  04/25/2024   • COVID-19 Vaccine  Completed   • Pneumococcal Vaccine 65+  Completed   • ZOSTER VACCINE  Completed                  CMS Preventative Services Quick Reference  Risk Factors Identified During Encounter:    None Identified    The above risks/problems have been discussed with the patient.  Pertinent information has been shared with the patient in the After Visit Summary.    Diagnoses and all orders for this visit:    1. Medicare annual wellness visit, subsequent (Primary)  Assessment & Plan:  AWV completed 5/23.  Patient remains active and independent  No falls, no hospitalizations past year.  Does not have advanced directive, however she does have one of her children designated as her medical POA.      2. Type 2 diabetes mellitus without complication, with long-term current use of insulin  Assessment & Plan:  A1c unfortunately back up from 7.6 to 8.4 as of 5/23.  Patient's morning sugars are generally in the 120 ballpark, but she can jump up to 200+ during the day.  Her Trulicity was titrated from 1.5 to 3 mg by endocrinology recently. She will also add Farxgia given to her by Renal.        3. Stage 3b chronic kidney disease  Assessment & Plan:  Patient's GFR has trended down slightly to 29 as of 5/23.  Potassium is up a little bit as well to 5.1.  She saw Dr. Garcia recently him he discussed with her SGLT-2 inhibitors and also gave her the option to lower the losartan to see if she is having any adverse effect from it presently.  Given excellent blood pressure, the patient chose to back off some on losartan, however given the elevated A1c presently, I recommend she give Farxgia a try instead.  This would hopefully kill 2 birds with 1  stone.  If she has early and repeated urine infections, then obviously this would not be the agent for her.      4. Mixed hyperlipidemia  Assessment & Plan:  After being around 50 several times in a row, her LDL jumped to 106 in 10/22.  She is down to 83 as of 5/23, so I think were fine to continue with just moderate dose Crestor.  She saw cardiology here recently and they did not have an issue with this current level either.      5. Primary hypertension  Assessment & Plan:  Blood pressure ambrosio well controlled as of 5/23 office visit.  Patient will continue with moderate dose losartan and low-dose metoprolol.      6. Vitamin D deficiency  Assessment & Plan:  Vitamin D level is stable around 45 as of 5/23.  Patient will continue with once weekly prescription strength vitamin D.      7. Megaloblastic anemia due to vitamin B12 deficiency  Assessment & Plan:  B12 is up from about 200 to 500 as of her 5/23 office visit.  She is not anemic and her MCV is normal presently.  Continue current supplementation      Other orders  -     vitamin D (ERGOCALCIFEROL) 1.25 MG (63333 UT) capsule capsule; Take 1 capsule by mouth 1 (One) Time Per Week.  Dispense: 12 capsule; Refill: 1      Follow Up:   Next Medicare Wellness visit to be scheduled in 1 year.      An After Visit Summary and PPPS were made available to the patient.

## 2023-05-09 NOTE — ASSESSMENT & PLAN NOTE
After being around 50 several times in a row, her LDL jumped to 106 in 10/22.  She is down to 83 as of 5/23, so I think were fine to continue with just moderate dose Crestor.  She saw cardiology here recently and they did not have an issue with this current level either.

## 2023-05-09 NOTE — ASSESSMENT & PLAN NOTE
B12 is up from about 200 to 500 as of her 5/23 office visit.  She is not anemic and her MCV is normal presently.  Continue current supplementation

## 2023-06-14 ENCOUNTER — LAB (OUTPATIENT)
Dept: LAB | Facility: HOSPITAL | Age: 83
End: 2023-06-14
Payer: MEDICARE

## 2023-06-14 DIAGNOSIS — N18.32 STAGE 3B CHRONIC KIDNEY DISEASE: ICD-10-CM

## 2023-06-14 DIAGNOSIS — E11.9 TYPE 2 DIABETES MELLITUS WITHOUT COMPLICATION, WITH LONG-TERM CURRENT USE OF INSULIN: ICD-10-CM

## 2023-06-14 DIAGNOSIS — Z79.4 TYPE 2 DIABETES MELLITUS WITHOUT COMPLICATION, WITH LONG-TERM CURRENT USE OF INSULIN: ICD-10-CM

## 2023-06-14 LAB
ALBUMIN SERPL-MCNC: 4.2 G/DL (ref 3.5–5.2)
ANION GAP SERPL CALCULATED.3IONS-SCNC: 11.3 MMOL/L (ref 5–15)
BUN SERPL-MCNC: 11 MG/DL (ref 8–23)
BUN/CREAT SERPL: 7.1 (ref 7–25)
CALCIUM SPEC-SCNC: 9.4 MG/DL (ref 8.6–10.5)
CHLORIDE SERPL-SCNC: 107 MMOL/L (ref 98–107)
CO2 SERPL-SCNC: 22.7 MMOL/L (ref 22–29)
CREAT SERPL-MCNC: 1.56 MG/DL (ref 0.57–1)
EGFRCR SERPLBLD CKD-EPI 2021: 33.3 ML/MIN/1.73
GLUCOSE SERPL-MCNC: 132 MG/DL (ref 65–99)
HBA1C MFR BLD: 7.3 % (ref 4.8–5.6)
PHOSPHATE SERPL-MCNC: 3.6 MG/DL (ref 2.5–4.5)
POTASSIUM SERPL-SCNC: 5.1 MMOL/L (ref 3.5–5.2)
SODIUM SERPL-SCNC: 141 MMOL/L (ref 136–145)

## 2023-06-14 PROCEDURE — 82985 ASSAY OF GLYCATED PROTEIN: CPT

## 2023-06-14 PROCEDURE — 36415 COLL VENOUS BLD VENIPUNCTURE: CPT

## 2023-06-14 PROCEDURE — 83036 HEMOGLOBIN GLYCOSYLATED A1C: CPT

## 2023-06-14 PROCEDURE — 80069 RENAL FUNCTION PANEL: CPT

## 2023-06-15 LAB — FRUCTOSAMINE SERPL-SCNC: 266 UMOL/L (ref 0–285)

## 2023-06-30 NOTE — ASSESSMENT & PLAN NOTE
A1c was 7.4 in 9/21.  Patient was seen by her endocrinologist who was comfortable with that level, no changes made.  Patient stable to continue near max dose Metformin and max dose glipizide.---> A1c is up to 8.5 as of her 4/22 office visit.  This is over the winter months, and additionally the patient is having an occasional low in the middle the night.  She has appointment with endocrinology next month, will defer to their recommendations.     I was physically present and participated in this delivery.

## 2023-07-20 ENCOUNTER — TELEPHONE (OUTPATIENT)
Dept: CARDIOLOGY | Facility: CLINIC | Age: 83
End: 2023-07-20

## 2023-07-20 NOTE — TELEPHONE ENCOUNTER
[facility] received a fax that requires your attention. The document has been indexed to the patient’s chart for your review.      Reason for sending: EXTERNAL MEDICAL RECORD NOTIFICATION     Documents Description: PROGRESS NOTE 4.25.23    Name of Sender: DR. MARY ESPINOZA     Date Indexed: 7.20.23

## 2023-08-11 ENCOUNTER — TRANSCRIBE ORDERS (OUTPATIENT)
Dept: LAB | Facility: HOSPITAL | Age: 83
End: 2023-08-11
Payer: MEDICARE

## 2023-08-11 ENCOUNTER — LAB (OUTPATIENT)
Dept: LAB | Facility: HOSPITAL | Age: 83
End: 2023-08-11
Payer: MEDICARE

## 2023-08-11 DIAGNOSIS — E11.21 DIABETIC GLOMERULOPATHY: ICD-10-CM

## 2023-08-11 DIAGNOSIS — E11.21 DIABETIC GLOMERULOPATHY: Primary | ICD-10-CM

## 2023-08-11 LAB
ALBUMIN SERPL-MCNC: 4.3 G/DL (ref 3.5–5.2)
ALBUMIN/GLOB SERPL: 1.6 G/DL
ALP SERPL-CCNC: 72 U/L (ref 39–117)
ALT SERPL W P-5'-P-CCNC: 15 U/L (ref 1–33)
ANION GAP SERPL CALCULATED.3IONS-SCNC: 14.3 MMOL/L (ref 5–15)
AST SERPL-CCNC: 20 U/L (ref 1–32)
BILIRUB SERPL-MCNC: 0.4 MG/DL (ref 0–1.2)
BUN SERPL-MCNC: 21 MG/DL (ref 8–23)
BUN/CREAT SERPL: 14.6 (ref 7–25)
CALCIUM SPEC-SCNC: 9.5 MG/DL (ref 8.6–10.5)
CHLORIDE SERPL-SCNC: 102 MMOL/L (ref 98–107)
CO2 SERPL-SCNC: 23.7 MMOL/L (ref 22–29)
CREAT SERPL-MCNC: 1.44 MG/DL (ref 0.57–1)
EGFRCR SERPLBLD CKD-EPI 2021: 36.6 ML/MIN/1.73
GLOBULIN UR ELPH-MCNC: 2.7 GM/DL
GLUCOSE SERPL-MCNC: 131 MG/DL (ref 65–99)
HBA1C MFR BLD: 7.6 % (ref 4.8–5.6)
POTASSIUM SERPL-SCNC: 4.6 MMOL/L (ref 3.5–5.2)
PROT SERPL-MCNC: 7 G/DL (ref 6–8.5)
SODIUM SERPL-SCNC: 140 MMOL/L (ref 136–145)

## 2023-08-11 PROCEDURE — 84681 ASSAY OF C-PEPTIDE: CPT

## 2023-08-11 PROCEDURE — 80053 COMPREHEN METABOLIC PANEL: CPT

## 2023-08-11 PROCEDURE — 36415 COLL VENOUS BLD VENIPUNCTURE: CPT

## 2023-08-11 PROCEDURE — 83036 HEMOGLOBIN GLYCOSYLATED A1C: CPT

## 2023-08-12 LAB — C PEPTIDE SERPL-MCNC: 6.7 NG/ML (ref 1.1–4.4)

## 2023-09-18 ENCOUNTER — OFFICE VISIT (OUTPATIENT)
Dept: INTERNAL MEDICINE | Facility: CLINIC | Age: 83
End: 2023-09-18
Payer: MEDICARE

## 2023-09-18 VITALS
WEIGHT: 129.6 LBS | SYSTOLIC BLOOD PRESSURE: 124 MMHG | BODY MASS INDEX: 23.85 KG/M2 | HEIGHT: 62 IN | HEART RATE: 64 BPM | DIASTOLIC BLOOD PRESSURE: 82 MMHG | TEMPERATURE: 97.6 F | OXYGEN SATURATION: 98 % | RESPIRATION RATE: 18 BRPM

## 2023-09-18 DIAGNOSIS — B37.31 VAGINAL CANDIDIASIS: Primary | ICD-10-CM

## 2023-09-18 PROCEDURE — 1160F RVW MEDS BY RX/DR IN RCRD: CPT

## 2023-09-18 PROCEDURE — 99213 OFFICE O/P EST LOW 20 MIN: CPT

## 2023-09-18 PROCEDURE — 3079F DIAST BP 80-89 MM HG: CPT

## 2023-09-18 PROCEDURE — 1159F MED LIST DOCD IN RCRD: CPT

## 2023-09-18 PROCEDURE — 3074F SYST BP LT 130 MM HG: CPT

## 2023-09-18 RX ORDER — BROMPHENIRAMIN/PSEUDOEPHEDRINE 1-15MG/5ML
1 LIQUID (ML) ORAL NIGHTLY
Qty: 21 G | Refills: 0 | Status: SHIPPED | OUTPATIENT
Start: 2023-09-18

## 2023-09-18 RX ORDER — FLUCONAZOLE 150 MG/1
150 TABLET ORAL ONCE
Qty: 2 TABLET | Refills: 0 | Status: SHIPPED | OUTPATIENT
Start: 2023-09-18 | End: 2023-09-18

## 2023-09-18 RX ORDER — DAPAGLIFLOZIN 10 MG/1
1 TABLET, FILM COATED ORAL EVERY MORNING
COMMUNITY

## 2023-09-18 NOTE — ASSESSMENT & PLAN NOTE
Patient complains of vaginal irritation/burning for 2 days. She admits she does not drink much water. She is type 2 diabetic with CKD and is on farxiga. She has been on farxiga for about 4 months and this has been her first complaint.  She is unable to give urine sample at this time.  Upon exam, she does have some excoriation noted and yeast appears to be present.   Treatment per orders.  She will hold farxiga for a week, then start it back. Increase fluids. If no improvement, or this becomes recurrent patient to let us know.  Follow up as needed.

## 2023-09-18 NOTE — PROGRESS NOTES
Chief Complaint  Vaginal Discharge (82 year old female here today complaining of vaginal burning and irritation X 2-3 days/States she thinks it due to taking Farxiga. )    History of Present Illness  SUBJECTIVE  Lucía Thomas presents to [facility] with complaints of vaginal burning/irritation. This has been present for a couple of days. Denies any itch or discharge.    Denies pain with urination, dysuria or hematuria.     She has been on farxiga for about 4 months.    She is unable to give urine sample today.    Patient admits that she does not drink much fluids.      Past Medical History:   Diagnosis Date    Atherosclerosis of coronary artery bypass graft without angina pectoris     Breast cancer     CAD (coronary artery disease)     Chronic fatigue, unspecified     Colon polyps     Disorder of bone, unspecified     DM (diabetes mellitus)     Essential (primary) hypertension     Iron deficiency anemia, unspecified     Mixed hyperlipidemia     Peripheral vascular disease, unspecified     Pure hypercholesterolemia     PVD (peripheral vascular disease)     Type 2 diabetes mellitus without complication     Vitamin D deficiency, unspecified       Family History   Problem Relation Age of Onset    Colon cancer Neg Hx     Malig Hyperthermia Neg Hx       Past Surgical History:   Procedure Laterality Date    BREAST LUMPECTOMY Left     COLONOSCOPY  2011    Dr. guidry in [place]    COLONOSCOPY N/A 1/18/2023    Procedure: COLONOSCOPY with cold snare polypectomy;  Surgeon: Mark Andrews MD;  Location: [facility] ENDOSCOPY;  Service: Gastroenterology;  Laterality: N/A;  colon polyps    CORONARY ARTERY BYPASS GRAFT      ECTOPIC PREGNANCY Right         Current Outpatient Medications:     Accu-Chek SmartView test strip, 2 (Two) Times a Day. test blood sugar, Disp: , Rfl:     Aspirin Low Dose 81 MG EC tablet, Take 1 tablet by mouth Every Evening., Disp: , Rfl:     Farxiga 10 MG  "tablet, Take 10 mg by mouth Every Morning. Prescribed by Dr Ken Zavaleta [place], Disp: , Rfl:     glipizide (GLUCOTROL) 10 MG tablet, Take 1 tablet by mouth 2 (Two) Times a Day Before Meals. PT takes 5mg BID, Disp: , Rfl:     metoprolol succinate XL (TOPROL-XL) 50 MG 24 hr tablet, TAKE ONE AND ONE-HALF tablets BY MOUTH EVERY DAY, Disp: 135 tablet, Rfl: 1    rosuvastatin (CRESTOR) 20 MG tablet, Take 1 tablet by mouth Daily., Disp: , Rfl:     Sod Picosulfate-Mag Ox-Cit Acd (Clenpiq) 10-3.5-12 MG-GM -GM/160ML solution, Take 160 mL by mouth Take As Directed., Disp: 320 mL, Rfl: 0    Trulicity 3 MG/0.5ML solution pen-injector, Inject 3 mg under the skin every 7 (seven) days., Disp: , Rfl:     vitamin D (ERGOCALCIFEROL) 1.25 MG (53489 UT) capsule capsule, Take 1 capsule by mouth 1 (One) Time Per Week., Disp: 12 capsule, Rfl: 1    Clotrimazole (Clotrimazole 3) 2 % vaginal cream, Insert 1 application  into the vagina Every Night., Disp: 21 g, Rfl: 0    fluconazole (Diflucan) 150 MG tablet, Take 1 tablet by mouth 1 (One) Time for 1 dose. Repeat in 72 hours if needed, Disp: 2 tablet, Rfl: 0    losartan (COZAAR) 50 MG tablet, TAKE one AND ONE-HALF tablet BY MOUTH EVERY DAY (Patient not taking: Reported on 9/18/2023), Disp: 135 tablet, Rfl: 1    OBJECTIVE  Vital Signs:   /82 (BP Location: Left arm, Patient Position: Sitting)   Pulse 64   Temp 97.6 °F (36.4 °C) (Temporal)   Resp 18   Ht 157.5 cm (62.01\")   Wt 58.8 kg (129 lb 9.6 oz)   SpO2 98%   BMI 23.70 kg/m²    Estimated body mass index is 23.7 kg/m² as calculated from the following:    Height as of this encounter: 157.5 cm (62.01\").    Weight as of this encounter: 58.8 kg (129 lb 9.6 oz).     Wt Readings from Last 3 Encounters:   09/18/23 58.8 kg (129 lb 9.6 oz)   05/09/23 59.6 kg (131 lb 6.4 oz)   01/18/23 58 kg (127 lb 13.9 oz)     BP Readings from Last 3 Encounters:   09/18/23 124/82   05/09/23 110/52   01/18/23 116/67       Physical Exam  Vitals and nursing " note reviewed. Exam conducted with a chaperone present.   Constitutional:       Appearance: Normal appearance.   HENT:      Head: Normocephalic.   Eyes:      Extraocular Movements: Extraocular movements intact.      Conjunctiva/sclera: Conjunctivae normal.   Cardiovascular:      Rate and Rhythm: Normal rate and regular rhythm.   Pulmonary:      Effort: Pulmonary effort is normal.      Breath sounds: Normal breath sounds.   Abdominal:      General: Bowel sounds are normal.      Palpations: Abdomen is soft.   Genitourinary:     Exam position: Supine.      Comments: Excoriation noted bilat labia, small amount of what appears to be yeast present, no foul odor  Musculoskeletal:         General: No swelling. Normal range of motion.      Cervical back: Normal range of motion and neck supple.   Skin:     General: Skin is warm and dry.   Neurological:      General: No focal deficit present.      Mental Status: She is alert and oriented to person, place, and time. Mental status is at baseline.   Psychiatric:         Mood and Affect: Mood normal.         Behavior: Behavior normal.         Thought Content: Thought content normal.         Judgment: Judgment normal.        Result Review        No Images in the past 120 days found..     The above data has been reviewed by AGUS Bauer 09/18/2023 09:55 EDT.          Patient Care Team:  Juan Alberto Genao MD as PCP - General (Internal Medicine)    BMI is within normal parameters. No other follow-up for BMI required.       ASSESSMENT & PLAN    Diagnoses and all orders for this visit:    1. Vaginal candidiasis (Primary)  Assessment & Plan:  Patient complains of vaginal irritation/burning for 2 days. She admits she does not drink much water. She is type 2 diabetic with CKD and is on farxiga. She has been on farxiga for about 4 months and this has been her first complaint.  She is unable to give urine sample at this time.  Upon exam, she does have some excoriation noted and yeast  appears to be present.   Treatment per orders.  She will hold farxiga for a week, then start it back. Increase fluids. If no improvement, or this becomes recurrent patient to let us know.  Follow up as needed.     Orders:  -     fluconazole (Diflucan) 150 MG tablet; Take 1 tablet by mouth 1 (One) Time for 1 dose. Repeat in 72 hours if needed  Dispense: 2 tablet; Refill: 0  -     Clotrimazole (Clotrimazole 3) 2 % vaginal cream; Insert 1 application  into the vagina Every Night.  Dispense: 21 g; Refill: 0         Tobacco Use: Low Risk     Smoking Tobacco Use: Never    Smokeless Tobacco Use: Never    Passive Exposure: Not on file       Follow Up     Return if symptoms worsen or fail to improve.    Please note that portions of this note were completed with a voice recognition program.    Patient was given instructions and counseling regarding her condition or for health maintenance advice. Please see specific information pulled into the AVS if appropriate.   I have reviewed information obtained and documented by others and I have confirmed the accuracy of this documented note.    AGUS Bauer

## 2023-10-04 ENCOUNTER — OFFICE VISIT (OUTPATIENT)
Dept: CARDIOLOGY | Facility: CLINIC | Age: 83
End: 2023-10-04
Payer: MEDICARE

## 2023-10-04 VITALS
DIASTOLIC BLOOD PRESSURE: 73 MMHG | HEART RATE: 66 BPM | WEIGHT: 130 LBS | BODY MASS INDEX: 23.92 KG/M2 | HEIGHT: 62 IN | SYSTOLIC BLOOD PRESSURE: 138 MMHG

## 2023-10-04 DIAGNOSIS — I65.23 BILATERAL CAROTID ARTERY STENOSIS: ICD-10-CM

## 2023-10-04 DIAGNOSIS — I25.10 CORONARY ARTERY DISEASE INVOLVING NATIVE CORONARY ARTERY OF NATIVE HEART WITHOUT ANGINA PECTORIS: Primary | ICD-10-CM

## 2023-10-04 DIAGNOSIS — I10 HYPERTENSION, ESSENTIAL: ICD-10-CM

## 2023-10-04 DIAGNOSIS — E78.2 HYPERLIPEMIA, MIXED: ICD-10-CM

## 2023-10-04 DIAGNOSIS — N18.30 STAGE 3 CHRONIC KIDNEY DISEASE, UNSPECIFIED WHETHER STAGE 3A OR 3B CKD: ICD-10-CM

## 2023-10-04 NOTE — PROGRESS NOTES
Chief Complaint  Ischemic heart disease, HX OF HEART CATH AND CABG, Hyperlipidemia, Hypertension, and Establish Care    Subjective            Lucía Thomas presents to [facility] CARDIOLOGY  Hyperlipidemia  Pertinent negatives include no chest pain or shortness of breath.   Hypertension  Pertinent negatives include no chest pain or shortness of breath.     82-year-old white female.  She has known coronary artery disease with CABG in 2004.  She has a chronically occluded circumflex with collaterals noted on previous catheterization in 2012.  She has chronic kidney disease, hypertension, mixed hyperlipidemia.  She is establishing care here locally after being seen in [place] for many years.  She is very active.  She denies chest pain, palpitations or excessive shortness of breath.  She is compliant with her medical therapy.    PMH  Past Medical History:   Diagnosis Date    Atherosclerosis of coronary artery bypass graft without angina pectoris     Breast cancer     CAD (coronary artery disease)     Chronic fatigue, unspecified     Colon polyps     Disorder of bone, unspecified     DM (diabetes mellitus)     Essential (primary) hypertension     Iron deficiency anemia, unspecified     Mixed hyperlipidemia     Peripheral vascular disease, unspecified     Pure hypercholesterolemia     PVD (peripheral vascular disease)     Type 2 diabetes mellitus without complication     Vitamin D deficiency, unspecified          SURGICALHX  Past Surgical History:   Procedure Laterality Date    BREAST LUMPECTOMY Left     COLONOSCOPY  2011    Dr. guidry in [place]    COLONOSCOPY N/A 1/18/2023    Procedure: COLONOSCOPY with cold snare polypectomy;  Surgeon: Mark Andrews MD;  Location: [facility] ENDOSCOPY;  Service: Gastroenterology;  Laterality: N/A;  colon polyps    CORONARY ARTERY BYPASS GRAFT      ECTOPIC PREGNANCY Right         SOC  Social History     Socioeconomic History    Marital status:     Tobacco Use    Smoking status: Never    Smokeless tobacco: Never   Vaping Use    Vaping Use: Never used   Substance and Sexual Activity    Alcohol use: Never    Drug use: Never    Sexual activity: Defer         FAMHX  Family History   Problem Relation Age of Onset    Colon cancer Neg Hx     Malig Hyperthermia Neg Hx           ALLERGY  No Known Allergies     MEDSCURRENT    Current Outpatient Medications:     Accu-Chek SmartView test strip, 2 (Two) Times a Day. test blood sugar, Disp: , Rfl:     Aspirin Low Dose 81 MG EC tablet, Take 1 tablet by mouth Every Evening., Disp: , Rfl:     Farxiga 10 MG tablet, Take 10 mg by mouth Every Morning. Prescribed by Dr Ken Zavaleta [place], Disp: , Rfl:     glipizide (GLUCOTROL) 10 MG tablet, Take 1 tablet by mouth 2 (Two) Times a Day Before Meals. PT takes 5mg BID, Disp: , Rfl:     metoprolol succinate XL (TOPROL-XL) 50 MG 24 hr tablet, TAKE ONE AND ONE-HALF tablets BY MOUTH EVERY DAY, Disp: 135 tablet, Rfl: 1    rosuvastatin (CRESTOR) 20 MG tablet, Take 1 tablet by mouth Daily., Disp: , Rfl:     Trulicity 3 MG/0.5ML solution pen-injector, Inject 3 mg under the skin every 7 (seven) days., Disp: , Rfl:     vitamin D (ERGOCALCIFEROL) 1.25 MG (19310 UT) capsule capsule, Take 1 capsule by mouth 1 (One) Time Per Week., Disp: 12 capsule, Rfl: 1    Clotrimazole (Clotrimazole 3) 2 % vaginal cream, Insert 1 application  into the vagina Every Night., Disp: 21 g, Rfl: 0    losartan (COZAAR) 50 MG tablet, TAKE one AND ONE-HALF tablet BY MOUTH EVERY DAY, Disp: 135 tablet, Rfl: 1    Sod Picosulfate-Mag Ox-Cit Acd (Clenpiq) 10-3.5-12 MG-GM -GM/160ML solution, Take 160 mL by mouth Take As Directed., Disp: 320 mL, Rfl: 0      Review of Systems   Constitutional: Negative.   HENT: Negative.     Eyes: Negative.    Cardiovascular:  Negative for chest pain and dyspnea on exertion.   Respiratory: Negative.  Negative for shortness of breath.    Endocrine: Negative.    Hematologic/Lymphatic: Negative.   "  Skin: Negative.    Musculoskeletal: Negative.    Gastrointestinal: Negative.    Genitourinary: Negative.    Neurological: Negative.    Psychiatric/Behavioral: Negative.        Objective     /73   Pulse 66   Ht 157.5 cm (62.01\")   Wt 59 kg (130 lb)   BMI 23.77 kg/m²       General Appearance:   well developed  well nourished  HENT:   oropharynx moist  lips not cyanotic  Neck:  thyroid not enlarged  supple  Respiratory:  no respiratory distress  normal breath sounds  no rales  Cardiovascular:  no jugular venous distention  regular rhythm  apical impulse normal  S1 normal, S2 normal  no S3, no S4   Very soft ejection systolic murmur  no rub, no thrill  carotid pulses normal; no bruit  pedal pulses normal  lower extremity edema: none    Musculoskeletal:  no clubbing of fingers.   normocephalic, head atraumatic  Skin:   warm, dry  Psychiatric:  judgement and insight appropriate  normal mood and affect      Result Review :     The following data was reviewed by: Stiven Gibson MD on 10/04/2023:    CMP          4/25/2023    09:29 6/14/2023    08:27 8/11/2023    07:44   CMP   Glucose 133  132  131    BUN 12  11  21    Creatinine 1.72  1.56  1.44    EGFR 29.6  33.3  36.6    Sodium 139  141  140    Potassium 5.1  5.1  4.6    Chloride 106  107  102    Calcium 10.3  9.4  9.5    Total Protein 7.6   7.0    Albumin 4.6  4.2  4.3    Globulin 3.0   2.7    Total Bilirubin 0.8   0.4    Alkaline Phosphatase 76   72    AST (SGOT) 20   20    ALT (SGPT) 21   15    Albumin/Globulin Ratio 1.5   1.6    BUN/Creatinine Ratio 7.0  7.1  14.6    Anion Gap 8.1  11.3  14.3      CBC          12/27/2022    13:30 4/25/2023    09:29   CBC   WBC 8.2     6.09    RBC 4.47     4.36    Hemoglobin 13.1     12.9    Hematocrit 39.7     38.9    MCV 88.8     89.2    MCH  29.6    MCHC  33.2    RDW 13.1     13.5    Platelets 242     228       Details          This result is from an external source.             Lipid Panel          4/25/2023 "    09:29   Lipid Panel   Total Cholesterol 162    Triglycerides 213    HDL Cholesterol 43    VLDL Cholesterol 36    LDL Cholesterol  83    LDL/HDL Ratio 1.78      TSH          4/25/2023    09:29   TSH   TSH 2.680        Data reviewed : Previous cardiology records reviewed, primary care records reviewed        ECG 12 Lead    Date/Time: 10/4/2023 9:17 AM  Performed by: JEAN CARLOS Gibson MD  Authorized by: JEAN CARLOS Gibson MD   Comparison: not compared with previous ECG   Previous ECG: no previous ECG available  Rhythm: sinus rhythm  Conduction: conduction normal  ST Segments: ST segments normal  T Waves: T waves normal  QRS axis: normal  Other: no other findings    Clinical impression: normal ECG                  Assessment and Plan        ASSESSMENT:  Encounter Diagnoses   Name Primary?    Coronary artery disease involving native coronary artery of native heart without angina pectoris Yes    Hypertension, essential     Hyperlipemia, mixed     Bilateral carotid artery stenosis     Stage 3 chronic kidney disease, unspecified whether stage 3a or 3b CKD          PLAN:    1.  Coronary artery disease, native vessel without angina.  She had CABG in 2004.  She is clinically stable.  Continue current medical therapy  2.  Essential hypertension-stable, she was recently taken off of ARB due to renal function issues.  I agree with that decision.  3.  Mixed hyperlipidemia-controlled, continue statin therapy  4.  Bilateral carotid artery stenosis-the patient clearly states she does not want to have anything done about that so there is no reason at this point to repeat duplex ultrasound.  Continue aspirin and statin  5.  Chronic kidney disease-baseline creatinine about 1.7, follows with Dr. Rogers with nephrology    Annual follow-up will be arranged          Patient was given instructions and counseling regarding her condition or for health maintenance advice. Please see specific information pulled into the AVS if appropriate.              C Humberto Gibson MD  10/4/2023    09:16 EDT

## 2023-10-10 ENCOUNTER — TRANSCRIBE ORDERS (OUTPATIENT)
Dept: LAB | Facility: HOSPITAL | Age: 83
End: 2023-10-10
Payer: MEDICARE

## 2023-10-10 ENCOUNTER — LAB (OUTPATIENT)
Dept: LAB | Facility: HOSPITAL | Age: 83
End: 2023-10-10
Payer: MEDICARE

## 2023-10-10 DIAGNOSIS — I12.9 HYPERTENSIVE NEPHROPATHY: ICD-10-CM

## 2023-10-10 DIAGNOSIS — N18.32 CHRONIC KIDNEY DISEASE (CKD) STAGE G3B/A1, MODERATELY DECREASED GLOMERULAR FILTRATION RATE (GFR) BETWEEN 30-44 ML/MIN/1.73 SQUARE METER AND ALBUMINURIA CREATININE RATIO LESS THAN 30 MG/G (CMS/H*: ICD-10-CM

## 2023-10-10 DIAGNOSIS — N18.32 STAGE 3B CHRONIC KIDNEY DISEASE: ICD-10-CM

## 2023-10-10 DIAGNOSIS — Z79.4 TYPE 2 DIABETES MELLITUS WITHOUT COMPLICATION, WITH LONG-TERM CURRENT USE OF INSULIN: ICD-10-CM

## 2023-10-10 DIAGNOSIS — E11.9 DIABETES MELLITUS WITHOUT COMPLICATION: ICD-10-CM

## 2023-10-10 DIAGNOSIS — E11.9 TYPE 2 DIABETES MELLITUS WITHOUT COMPLICATION, WITH LONG-TERM CURRENT USE OF INSULIN: ICD-10-CM

## 2023-10-10 DIAGNOSIS — E13.40: ICD-10-CM

## 2023-10-10 DIAGNOSIS — N18.32 CHRONIC KIDNEY DISEASE (CKD) STAGE G3B/A1, MODERATELY DECREASED GLOMERULAR FILTRATION RATE (GFR) BETWEEN 30-44 ML/MIN/1.73 SQUARE METER AND ALBUMINURIA CREATININE RATIO LESS THAN 30 MG/G (CMS/H*: Primary | ICD-10-CM

## 2023-10-10 DIAGNOSIS — E78.2 MIXED HYPERLIPIDEMIA: ICD-10-CM

## 2023-10-10 DIAGNOSIS — E13.40: Primary | ICD-10-CM

## 2023-10-10 LAB
ALBUMIN SERPL-MCNC: 4.7 G/DL (ref 3.5–5.2)
ALBUMIN UR-MCNC: 2.4 MG/DL
ALBUMIN/GLOB SERPL: 1.6 G/DL
ALP SERPL-CCNC: 75 U/L (ref 39–117)
ALT SERPL W P-5'-P-CCNC: 16 U/L (ref 1–33)
ANION GAP SERPL CALCULATED.3IONS-SCNC: 9 MMOL/L (ref 5–15)
AST SERPL-CCNC: 20 U/L (ref 1–32)
BILIRUB SERPL-MCNC: 0.6 MG/DL (ref 0–1.2)
BILIRUB UR QL STRIP: NEGATIVE
BUN SERPL-MCNC: 23 MG/DL (ref 8–23)
BUN/CREAT SERPL: 15.2 (ref 7–25)
CALCIUM SPEC-SCNC: 9.9 MG/DL (ref 8.6–10.5)
CHLORIDE SERPL-SCNC: 104 MMOL/L (ref 98–107)
CHOLEST SERPL-MCNC: 187 MG/DL (ref 0–200)
CLARITY UR: CLEAR
CO2 SERPL-SCNC: 25 MMOL/L (ref 22–29)
COLOR UR: YELLOW
CREAT SERPL-MCNC: 1.51 MG/DL (ref 0.57–1)
CREAT UR-MCNC: 25.2 MG/DL
CREAT UR-MCNC: 28.4 MG/DL
EGFRCR SERPLBLD CKD-EPI 2021: 34.6 ML/MIN/1.73
GLOBULIN UR ELPH-MCNC: 3 GM/DL
GLUCOSE SERPL-MCNC: 133 MG/DL (ref 65–99)
GLUCOSE UR STRIP-MCNC: ABNORMAL MG/DL
HBA1C MFR BLD: 7.3 % (ref 4.8–5.6)
HDLC SERPL-MCNC: 43 MG/DL (ref 40–60)
HGB UR QL STRIP.AUTO: NEGATIVE
KETONES UR QL STRIP: NEGATIVE
LDLC SERPL CALC-MCNC: 112 MG/DL (ref 0–100)
LDLC/HDLC SERPL: 2.5 {RATIO}
LEUKOCYTE ESTERASE UR QL STRIP.AUTO: NEGATIVE
MICROALBUMIN/CREAT UR: 95.2 MG/G (ref 0–29)
NITRITE UR QL STRIP: NEGATIVE
PH UR STRIP.AUTO: 6.5 [PH] (ref 5–8)
PHOSPHATE SERPL-MCNC: 4.1 MG/DL (ref 2.5–4.5)
POTASSIUM SERPL-SCNC: 5 MMOL/L (ref 3.5–5.2)
PROT ?TM UR-MCNC: 11.5 MG/DL
PROT SERPL-MCNC: 7.7 G/DL (ref 6–8.5)
PROT UR QL STRIP: NEGATIVE
PROT/CREAT UR: 0.4 MG/G{CREAT}
PTH-INTACT SERPL-MCNC: 72.9 PG/ML (ref 15–65)
SODIUM SERPL-SCNC: 138 MMOL/L (ref 136–145)
SP GR UR STRIP: 1.01 (ref 1–1.03)
TRIGL SERPL-MCNC: 182 MG/DL (ref 0–150)
TSH SERPL DL<=0.05 MIU/L-ACNC: 2.19 UIU/ML (ref 0.27–4.2)
URATE SERPL-MCNC: 4.9 MG/DL (ref 2.4–5.7)
UROBILINOGEN UR QL STRIP: ABNORMAL
VIT B12 BLD-MCNC: 243 PG/ML (ref 211–946)
VLDLC SERPL-MCNC: 32 MG/DL (ref 5–40)

## 2023-10-10 PROCEDURE — 84156 ASSAY OF PROTEIN URINE: CPT

## 2023-10-10 PROCEDURE — 83036 HEMOGLOBIN GLYCOSYLATED A1C: CPT

## 2023-10-10 PROCEDURE — 82570 ASSAY OF URINE CREATININE: CPT

## 2023-10-10 PROCEDURE — 84100 ASSAY OF PHOSPHORUS: CPT

## 2023-10-10 PROCEDURE — 81003 URINALYSIS AUTO W/O SCOPE: CPT

## 2023-10-10 PROCEDURE — 80053 COMPREHEN METABOLIC PANEL: CPT

## 2023-10-10 PROCEDURE — 82043 UR ALBUMIN QUANTITATIVE: CPT

## 2023-10-10 PROCEDURE — 36415 COLL VENOUS BLD VENIPUNCTURE: CPT

## 2023-10-10 PROCEDURE — 84443 ASSAY THYROID STIM HORMONE: CPT

## 2023-10-10 PROCEDURE — 80061 LIPID PANEL: CPT

## 2023-10-10 PROCEDURE — 82607 VITAMIN B-12: CPT

## 2023-10-10 PROCEDURE — 84550 ASSAY OF BLOOD/URIC ACID: CPT

## 2023-10-10 PROCEDURE — 83970 ASSAY OF PARATHORMONE: CPT

## 2023-10-13 ENCOUNTER — APPOINTMENT (OUTPATIENT)
Dept: CT IMAGING | Facility: HOSPITAL | Age: 83
End: 2023-10-13
Payer: MEDICARE

## 2023-10-13 ENCOUNTER — HOSPITAL ENCOUNTER (EMERGENCY)
Facility: HOSPITAL | Age: 83
Discharge: HOME | End: 2023-10-13
Attending: EMERGENCY MEDICINE
Payer: MEDICARE

## 2023-10-13 ENCOUNTER — APPOINTMENT (OUTPATIENT)
Dept: GENERAL RADIOLOGY | Facility: HOSPITAL | Age: 83
End: 2023-10-13
Payer: MEDICARE

## 2023-10-13 VITALS
OXYGEN SATURATION: 94 % | RESPIRATION RATE: 18 BRPM | SYSTOLIC BLOOD PRESSURE: 138 MMHG | BODY MASS INDEX: 22.43 KG/M2 | DIASTOLIC BLOOD PRESSURE: 64 MMHG | HEART RATE: 69 BPM | WEIGHT: 121.91 LBS | HEIGHT: 62 IN | TEMPERATURE: 97.6 F

## 2023-10-13 DIAGNOSIS — R07.9 CHEST PAIN, UNSPECIFIED TYPE: Primary | ICD-10-CM

## 2023-10-13 LAB
ALBUMIN SERPL-MCNC: 4 G/DL (ref 3.5–5.2)
ALBUMIN/GLOB SERPL: 1.3 G/DL
ALP SERPL-CCNC: 75 U/L (ref 39–117)
ALT SERPL W P-5'-P-CCNC: 15 U/L (ref 1–33)
ANION GAP SERPL CALCULATED.3IONS-SCNC: 11.2 MMOL/L (ref 5–15)
AST SERPL-CCNC: 21 U/L (ref 1–32)
BASOPHILS # BLD AUTO: 0.02 10*3/MM3 (ref 0–0.2)
BASOPHILS NFR BLD AUTO: 0.4 % (ref 0–1.5)
BILIRUB SERPL-MCNC: 0.3 MG/DL (ref 0–1.2)
BUN SERPL-MCNC: 23 MG/DL (ref 8–23)
BUN/CREAT SERPL: 15.4 (ref 7–25)
CALCIUM SPEC-SCNC: 9.1 MG/DL (ref 8.6–10.5)
CHLORIDE SERPL-SCNC: 107 MMOL/L (ref 98–107)
CO2 SERPL-SCNC: 20.8 MMOL/L (ref 22–29)
CREAT SERPL-MCNC: 1.49 MG/DL (ref 0.57–1)
DEPRECATED RDW RBC AUTO: 43.1 FL (ref 37–54)
EGFRCR SERPLBLD CKD-EPI 2021: 35.1 ML/MIN/1.73
EOSINOPHIL # BLD AUTO: 0.24 10*3/MM3 (ref 0–0.4)
EOSINOPHIL NFR BLD AUTO: 5.2 % (ref 0.3–6.2)
ERYTHROCYTE [DISTWIDTH] IN BLOOD BY AUTOMATED COUNT: 12.8 % (ref 12.3–15.4)
GEN 5 2HR TROPONIN T REFLEX: 11 NG/L
GLOBULIN UR ELPH-MCNC: 3.2 GM/DL
GLUCOSE SERPL-MCNC: 182 MG/DL (ref 65–99)
HCT VFR BLD AUTO: 40.4 % (ref 34–46.6)
HGB BLD-MCNC: 12.5 G/DL (ref 12–15.9)
HOLD SPECIMEN: NORMAL
HOLD SPECIMEN: NORMAL
IMM GRANULOCYTES # BLD AUTO: 0.01 10*3/MM3 (ref 0–0.05)
IMM GRANULOCYTES NFR BLD AUTO: 0.2 % (ref 0–0.5)
LIPASE SERPL-CCNC: 491 U/L (ref 13–60)
LYMPHOCYTES # BLD AUTO: 1.29 10*3/MM3 (ref 0.7–3.1)
LYMPHOCYTES NFR BLD AUTO: 27.9 % (ref 19.6–45.3)
MAGNESIUM SERPL-MCNC: 2.4 MG/DL (ref 1.6–2.4)
MCH RBC QN AUTO: 28.4 PG (ref 26.6–33)
MCHC RBC AUTO-ENTMCNC: 30.9 G/DL (ref 31.5–35.7)
MCV RBC AUTO: 91.8 FL (ref 79–97)
MONOCYTES # BLD AUTO: 0.46 10*3/MM3 (ref 0.1–0.9)
MONOCYTES NFR BLD AUTO: 10 % (ref 5–12)
NEUTROPHILS NFR BLD AUTO: 2.6 10*3/MM3 (ref 1.7–7)
NEUTROPHILS NFR BLD AUTO: 56.3 % (ref 42.7–76)
NRBC BLD AUTO-RTO: 0 /100 WBC (ref 0–0.2)
NT-PROBNP SERPL-MCNC: 105.2 PG/ML (ref 0–1800)
PLATELET # BLD AUTO: 232 10*3/MM3 (ref 140–450)
PMV BLD AUTO: 10.5 FL (ref 6–12)
POTASSIUM SERPL-SCNC: 4.5 MMOL/L (ref 3.5–5.2)
PROT SERPL-MCNC: 7.2 G/DL (ref 6–8.5)
QT INTERVAL: 387 MS
QT INTERVAL: 396 MS
QTC INTERVAL: 416 MS
QTC INTERVAL: 428 MS
RBC # BLD AUTO: 4.4 10*6/MM3 (ref 3.77–5.28)
SODIUM SERPL-SCNC: 139 MMOL/L (ref 136–145)
TROPONIN T DELTA: 0 NG/L
TROPONIN T SERPL HS-MCNC: 11 NG/L
WBC NRBC COR # BLD: 4.62 10*3/MM3 (ref 3.4–10.8)
WHOLE BLOOD HOLD COAG: NORMAL
WHOLE BLOOD HOLD SPECIMEN: NORMAL

## 2023-10-13 PROCEDURE — 83880 ASSAY OF NATRIURETIC PEPTIDE: CPT | Performed by: EMERGENCY MEDICINE

## 2023-10-13 PROCEDURE — 84484 ASSAY OF TROPONIN QUANT: CPT | Performed by: EMERGENCY MEDICINE

## 2023-10-13 PROCEDURE — 36415 COLL VENOUS BLD VENIPUNCTURE: CPT

## 2023-10-13 PROCEDURE — 99284 EMERGENCY DEPT VISIT MOD MDM: CPT

## 2023-10-13 PROCEDURE — 93005 ELECTROCARDIOGRAM TRACING: CPT

## 2023-10-13 PROCEDURE — 93005 ELECTROCARDIOGRAM TRACING: CPT | Performed by: EMERGENCY MEDICINE

## 2023-10-13 PROCEDURE — 74176 CT ABD & PELVIS W/O CONTRAST: CPT

## 2023-10-13 PROCEDURE — 83690 ASSAY OF LIPASE: CPT | Performed by: EMERGENCY MEDICINE

## 2023-10-13 PROCEDURE — 83735 ASSAY OF MAGNESIUM: CPT | Performed by: EMERGENCY MEDICINE

## 2023-10-13 PROCEDURE — 80053 COMPREHEN METABOLIC PANEL: CPT | Performed by: EMERGENCY MEDICINE

## 2023-10-13 PROCEDURE — 71045 X-RAY EXAM CHEST 1 VIEW: CPT

## 2023-10-13 PROCEDURE — 93010 ELECTROCARDIOGRAM REPORT: CPT | Performed by: INTERNAL MEDICINE

## 2023-10-13 PROCEDURE — 85025 COMPLETE CBC W/AUTO DIFF WBC: CPT | Performed by: EMERGENCY MEDICINE

## 2023-10-13 RX ORDER — SODIUM CHLORIDE 0.9 % (FLUSH) 0.9 %
10 SYRINGE (ML) INJECTION AS NEEDED
Status: DISCONTINUED | OUTPATIENT
Start: 2023-10-13 | End: 2023-10-13 | Stop reason: HOSPADM

## 2023-10-13 RX ORDER — ASPIRIN 81 MG/1
324 TABLET, CHEWABLE ORAL ONCE
Status: DISCONTINUED | OUTPATIENT
Start: 2023-10-13 | End: 2023-10-13 | Stop reason: HOSPADM

## 2023-10-13 NOTE — ED PROVIDER NOTES
Time: 3:53 AM EDT  Date of encounter:  10/13/2023  Independent Historian/Clinical History and Information was obtained by:   Patient    History is limited by: N/A    Chief Complaint: Chest pain      History of Present Illness:  Patient is a 82 y.o. year old female who presents to the emergency department for evaluation of left-sided chest pain that started earlier tonight.  Patient denies nausea and vomiting.  Patient denies cough and hemoptysis.  Patient denies leg pain and swelling.  Patient denies fever and chills.  Patient reports that the chest pain does radiate some to her back.  She states that she did cut the grass today.    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 guidry in [place]    COLONOSCOPY N/A 1/18/2023    Procedure: COLONOSCOPY with cold snare polypectomy;  Surgeon: Mark Andrews MD;  Location: [facility] ENDOSCOPY;  Service: Gastroenterology;  Laterality: N/A;  colon polyps    CORONARY ARTERY BYPASS GRAFT      ECTOPIC PREGNANCY Right      Family History   Problem Relation Age of Onset    Colon cancer Neg Hx     Malig Hyperthermia Neg Hx        Home Medications:  Prior to Admission medications    Medication Sig Start Date End Date Taking?  Authorizing Provider   Accu-Chek SmartView test strip 2 (Two) Times a Day. test blood sugar 9/20/21   Cal Wilkes MD   Aspirin Low Dose 81 MG EC tablet Take 1 tablet by mouth Every Evening. 8/1/21   Cal Wilkes MD   Clotrimazole (Clotrimazole 3) 2 % vaginal cream Insert 1 application  into the vagina Every Night. 9/18/23   Mireya Hall APRN   Farxiga 10 MG tablet Take 10 mg by mouth Every Morning. Prescribed by Cal Merida MD   glipizide (GLUCOTROL) 10 MG tablet Take 1 tablet by mouth 2 (Two) Times a Day Before Meals. PT takes 5mg BID    Cal Wilkes MD   losartan (COZAAR) 50 MG tablet TAKE one AND ONE-HALF tablet BY MOUTH EVERY DAY 2/20/23   Juan Alberto Genao MD   metoprolol succinate XL (TOPROL-XL) 50 MG 24 hr tablet TAKE ONE AND ONE-HALF tablets BY MOUTH EVERY DAY 7/21/23   Juan Alberto Genao MD   rosuvastatin (CRESTOR) 20 MG tablet Take 1 tablet by mouth Daily. 8/1/21   Cal Wilkes MD   Sod Picosulfate-Mag Ox-Cit Acd (Clenpiq) 10-3.5-12 MG-GM -GM/160ML solution Take 160 mL by mouth Take As Directed. 1/12/23   Sharmin Gama APRN   Trulicity 3 MG/0.5ML solution pen-injector Inject 3 mg under the skin every 7 (seven) days. 5/1/23   Cal Wilkes MD   vitamin D (ERGOCALCIFEROL) 1.25 MG (31986 UT) capsule capsule Take 1 capsule by mouth 1 (One) Time Per Week. 5/9/23   Juan Alberto Genao MD        Social History:   Social History     Tobacco Use    Smoking status: Never    Smokeless tobacco: Never   Vaping Use    Vaping Use: Never used   Substance Use Topics    Alcohol use: Never    Drug use: Never         Review of Systems:  Review of Systems   Constitutional:  Negative for chills and fever.   HENT:  Negative for congestion, rhinorrhea and sore throat.    Eyes:  Negative for pain and visual disturbance.   Respiratory:  Negative for apnea, cough, chest tightness and shortness of breath.    Cardiovascular:  Negative for chest pain and palpitations.  "  Gastrointestinal:  Negative for abdominal pain, diarrhea, nausea and vomiting.   Genitourinary:  Negative for difficulty urinating and dysuria.   Musculoskeletal:  Negative for joint swelling and myalgias.   Skin:  Negative for color change.   Neurological:  Negative for seizures and headaches.   Psychiatric/Behavioral: Negative.     All other systems reviewed and are negative.       Physical Exam:  /64   Pulse 69   Temp 97.6 øF (36.4 øC) (Oral)   Resp 18   Ht 157.5 cm (62\")   Wt 55.3 kg (121 lb 14.6 oz)   SpO2 94%   BMI 22.30 kg/mý     Physical Exam  Vitals and nursing note reviewed.   Constitutional:       General: She is not in acute distress.     Appearance: Normal appearance. She is not toxic-appearing.   HENT:      Head: Normocephalic and atraumatic.      Jaw: There is normal jaw occlusion.   Eyes:      General: Lids are normal.      Extraocular Movements: Extraocular movements intact.      Conjunctiva/sclera: Conjunctivae normal.      Pupils: Pupils are equal, round, and reactive to light.   Cardiovascular:      Rate and Rhythm: Normal rate and regular rhythm.      Pulses: Normal pulses.      Heart sounds: Normal heart sounds.   Pulmonary:      Effort: Pulmonary effort is normal. No respiratory distress.      Breath sounds: Normal breath sounds. No wheezing or rhonchi.   Chest:      Chest wall: Tenderness present.   Abdominal:      General: Abdomen is flat.      Palpations: Abdomen is soft.      Tenderness: There is no abdominal tenderness. There is no guarding or rebound.   Musculoskeletal:         General: Normal range of motion.      Cervical back: Normal range of motion and neck supple.      Right lower leg: No edema.      Left lower leg: No edema.   Skin:     General: Skin is warm and dry.   Neurological:      Mental Status: She is alert and oriented to person, place, and time. Mental status is at baseline.   Psychiatric:         Mood and Affect: Mood normal.            "       Procedures:  Procedures      Medical Decision Making:      Comorbidities that affect care:    Coronary Artery Disease    External Notes reviewed:    Previous Clinic Note: Patient was last seen in clinic for vaginal discharge.      The following orders were placed and all results were independently analyzed by me:  Orders Placed This Encounter   Procedures    XR Chest 1 View    CT Abdomen Pelvis Without Contrast    [place] Draw    High Sensitivity Troponin T    Comprehensive Metabolic Panel    Lipase    BNP    Magnesium    CBC Auto Differential    High Sensitivity Troponin T 2Hr    NPO Diet NPO Type: Strict NPO    Undress & Gown    Continuous Pulse Oximetry    Oxygen Therapy- Nasal Cannula; Titrate 1-6 LPM Per SpO2; 90 - 95%    ECG 12 Lead ED Triage Standing Order; Chest Pain    ECG 12 Lead ED Triage Standing Order; Chest Pain    Insert Peripheral IV    CBC & Differential    Green Top (Gel)    Lavender Top    Gold Top - SST    Light Blue Top       Medications Given in the Emergency Department:  Medications   sodium chloride 0.9 % flush 10 mL (has no administration in time range)   aspirin chewable tablet 324 mg (has no administration in time range)        ED Course:    ED Course as of 10/13/23 0557   Fri Oct 13, 2023   0355 EKG:    Rhythm: sinus  Rate: 70  Axis: normal  Intervals: normal  ST Segment: no elevations      Interpreted by me   [BN]      ED Course User Index  [BN] Dariel Jason MD       Labs:    Lab Results (last 24 hours)       Procedure Component Value Units Date/Time    High Sensitivity Troponin T [[id number]]  (Abnormal) Collected: 10/13/23 0323    Specimen: Blood Updated: 10/13/23 0355     HS Troponin T 11 ng/L     Narrative:      High Sensitive Troponin T Reference Range:  <10.0 ng/L- Negative Female for AMI  <15.0 ng/L- Negative Male for AMI  >=10 - Abnormal Female indicating possible myocardial injury.  >=15 - Abnormal Male indicating possible myocardial injury.   Clinicians would have  to utilize clinical acumen, EKG, Troponin, and serial changes to determine if it is an Acute Myocardial Infarction or myocardial injury due to an underlying chronic condition.         CBC & Differential [[id number]]  (Abnormal) Collected: 10/13/23 0323    Specimen: Blood Updated: 10/13/23 0331    Narrative:      The following orders were created for panel order CBC & Differential.  Procedure                               Abnormality         Status                     ---------                               -----------         ------                     CBC Auto Differential[[id number]]        Abnormal            Final result                 Please view results for these tests on the individual orders.    Comprehensive Metabolic Panel [[id number]]  (Abnormal) Collected: 10/13/23 0323    Specimen: Blood Updated: 10/13/23 0357     Glucose 182 mg/dL      BUN 23 mg/dL      Creatinine 1.49 mg/dL      Sodium 139 mmol/L      Potassium 4.5 mmol/L      Comment: Slight hemolysis detected by analyzer. Results may be affected.        Chloride 107 mmol/L      CO2 20.8 mmol/L      Calcium 9.1 mg/dL      Total Protein 7.2 g/dL      Albumin 4.0 g/dL      ALT (SGPT) 15 U/L      AST (SGOT) 21 U/L      Comment: Slight hemolysis detected by analyzer. Results may be affected.        Alkaline Phosphatase 75 U/L      Total Bilirubin 0.3 mg/dL      Globulin 3.2 gm/dL      A/G Ratio 1.3 g/dL      BUN/Creatinine Ratio 15.4     Anion Gap 11.2 mmol/L      eGFR 35.1 mL/min/1.73     Narrative:      GFR Normal >60  Chronic Kidney Disease <60  Kidney Failure <15    The GFR formula is only valid for adults with stable renal function between ages 18 and 70.    Lipase [[id number]]  (Abnormal) Collected: 10/13/23 0323    Specimen: Blood Updated: 10/13/23 0403     Lipase 491 U/L     BNP [[id number]]  (Normal) Collected: 10/13/23 0323    Specimen: Blood Updated: 10/13/23 0351     proBNP 105.2 pg/mL     Narrative:      This assay is used as an aid in the  diagnosis of individuals suspected of having heart failure. It can be used as an aid in the diagnosis of acute decompensated heart failure (ADHF) in patients presenting with signs and symptoms of ADHF to the emergency department (ED). In addition, NT-proBNP of <300 pg/mL indicates ADHF is not likely.    Age Range Result Interpretation  NT-proBNP Concentration (pg/mL:      <50             Positive            >450                   Gray                 300-450                    Negative             <300    50-75           Positive            >900                  Gray                300-900                  Negative            <300      >75             Positive            >1800                  Gray                300-1800                  Negative            <300    Magnesium [[id number]]  (Normal) Collected: 10/13/23 0323    Specimen: Blood Updated: 10/13/23 0357     Magnesium 2.4 mg/dL     CBC Auto Differential [[id number]]  (Abnormal) Collected: 10/13/23 0323    Specimen: Blood Updated: 10/13/23 0331     WBC 4.62 10*3/mm3      RBC 4.40 10*6/mm3      Hemoglobin 12.5 g/dL      Hematocrit 40.4 %      MCV 91.8 fL      MCH 28.4 pg      MCHC 30.9 g/dL      RDW 12.8 %      RDW-SD 43.1 fl      MPV 10.5 fL      Platelets 232 10*3/mm3      Neutrophil % 56.3 %      Lymphocyte % 27.9 %      Monocyte % 10.0 %      Eosinophil % 5.2 %      Basophil % 0.4 %      Immature Grans % 0.2 %      Neutrophils, Absolute 2.60 10*3/mm3      Lymphocytes, Absolute 1.29 10*3/mm3      Monocytes, Absolute 0.46 10*3/mm3      Eosinophils, Absolute 0.24 10*3/mm3      Basophils, Absolute 0.02 10*3/mm3      Immature Grans, Absolute 0.01 10*3/mm3      nRBC 0.0 /100 WBC     High Sensitivity Troponin T 2Hr [[id number]]  (Abnormal) Collected: 10/13/23 0517    Specimen: Blood Updated: 10/13/23 0541     HS Troponin T 11 ng/L      Troponin T Delta 0 ng/L     Narrative:      High Sensitive Troponin T Reference Range:  <10.0 ng/L- Negative Female for AMI  <15.0  ng/L- Negative Male for AMI  >=10 - Abnormal Female indicating possible myocardial injury.  >=15 - Abnormal Male indicating possible myocardial injury.   Clinicians would have to utilize clinical acumen, EKG, Troponin, and serial changes to determine if it is an Acute Myocardial Infarction or myocardial injury due to an underlying chronic condition.                  Imaging:    CT Abdomen Pelvis Without Contrast    Result Date: 10/13/2023  PROCEDURE: CT ABDOMEN PELVIS WO CONTRAST  COMPARISON: None  INDICATIONS: upper abd pain  TECHNIQUE: CT images were created without intravenous contrast.   PROTOCOL:   Standard imaging protocol performed    RADIATION:   DLP: 378.3 mGy*cm   Automated exposure control was utilized to minimize radiation dose.  FINDINGS:  Included lung bases are clear.  No pleural effusion.  Liver, gallbladder, spleen, pancreas, adrenal glands, kidneys appear unremarkable for noncontrast CT.  No evidence of nephroureterolithiasis or hydronephrosis.  No small bowel distention is seen.  Appendix is normal.  No urinary bladder, uterine, or adnexal abnormality is seen on noncontrast CT.  There is no significant free fluid or adenopathy.  Atherosclerotic vascular calcifications are noted.  No acute osseous abnormality is seen.        No acute abdominal or pelvic abnormality is identified on noncontrast CT.  Specifically, no evidence of nephroureterolithiasis or hydronephrosis.     VALERIA NAVARRETE MD       Electronically Signed and Approved By: VALERIA NAVARRETE MD on 10/13/2023 at 5:45             XR Chest 1 View    Result Date: 10/13/2023  PROCEDURE: XR CHEST 1 VW  COMPARISON: [facility], , CHEST AP/PA 1 VIEW, 9/22/2020, 13:56.  INDICATIONS: Chest Pain Triage Protocol  FINDINGS:  Lungs are well expanded and appear clear.  No pneumothorax or large pleural effusion is seen.  Cardiomediastinal contours appear stable, including changes from CABG.  Heart size is normal.        No acute  cardiopulmonary abnormality is identified.       VALERIA NAVARRETE MD       Electronically Signed and Approved By: VALERIA NAVARRETE MD on 10/13/2023 at 3:32                Differential Diagnosis and Discussion:    Chest Pain:  Based on the patient's signs and symptoms, I considered aortic dissection, myocardial infaction, pulmonary embolism, cardiac tamponade, pericarditis, pneumothorax, musculoskeletal chest pain and other differential diagnosis as an etiology of the patient's chest pain.     All labs were reviewed and interpreted by me.  All X-rays impressions were independently interpreted by me.  EKG was interpreted by me.    MDM     Amount and/or Complexity of Data Reviewed  Decide to obtain previous medical records or to obtain history from someone other than the patient: yes       The patient had an EKG that shows no acute changes. Specifically, there are no ST elevations, t-wave changes of concern, delta waves, or rhythm abnormalities warranting admission. The patient was placed on the cardiac monitor and observed with continuous telemetry. The patient has a chest x-ray interpreted by me that is negative for pneumothorax, pneumonia, and is essentially unremarkable. The patient has had unelevated troponins on blood draw.      The patient is resting comfortably and feels better, is alert and in no distress.  The patient's CBC that was reviewed and interpreted by me shows no abnormalities of critical concern. Of note, there is no anemia requiring a blood transfusion and the platelet count is acceptable.  The patient's CMP that was reviewed and interpretted by me shows no abnormalities of critical concern. Of note, the patient's sodium and potassium are acceptable. The patient's liver enzymes are unremarkable. The patient's renal function (creatinine) is preserved. The patient has a normal anion gap.  Troponin x2 are negative.  Patient does have a lipase of 491.  This was followed up with a CT scan.  Patient  states that she does not want the IV contrast and was done without contrast.  CT scan is negative for acute pancreatitis.  The patient states that she is unable to get stress test or cardiac catheterizations.  The repeat examination is unremarkable and benign. Electrocardiogram shows no signs of acute ischemia and the history, exam, diagnostic testing and current condition did not suggest that this patient is having an acute myocardial infarction, significant arrhythmia, unstable angina, esophageal perforation, pulmonary embolism, aortic dissection, severe pneumonia, sepsis for other significant pathology that would warrant further testing, continued ED treatment, admission, cardiology or other specialist consultation at this point. The vital signs have been stable. The patient's condition is stable and appropriate for discharge. The patient will pursue further outpatient evaluation with the primary care physician, or designated physician or cardiologist. The patient has expressed a clear and thorough understanding and agreed to follow-up as instructed.        Patient Care Considerations:    PERC: I used the PERC score to risk stratify the patient for PE and a CT of the chest was considered but ultimately not indicated in today's visit.      Consultants/Shared Management Plan:    None    Social Determinants of Health:    Patient is independent, reliable, and has access to care.       Disposition and Care Coordination:    Discharged: I considered escalation of care by admitting this patient for observation, however the patient has improved and is suitable and  stable for discharge.    I have explained the patient's condition, diagnoses and treatment plan based on the information available to me at this time. I have answered questions and addressed any concerns. The patient has a good  understanding of the patient's diagnosis, condition, and treatment plan as can be expected at this point. The vital signs have been  stable. The patient's condition is stable and appropriate for discharge from the emergency department.      The patient will pursue further outpatient evaluation with the primary care physician or other designated or consulting physician as outlined in the discharge instructions. They are agreeable to this plan of care and follow-up instructions have been explained in detail. The patient has received these instructions in written format and have expressed an understanding of the discharge instructions. The patient is aware that any significant change in condition or worsening of symptoms should prompt an immediate return to this or the closest emergency department or call to 911.  I have explained discharge medications and the need for follow up with the patient/caretakers. This was also printed in the discharge instructions. Patient was discharged with the following medications and follow up:      Medication List      No changes were made to your prescriptions during this visit.      Juan Alberto Genao MD  [address]  [address] [address]  [phone]    In 2 days         Final diagnoses:   Chest pain, unspecified type        ED Disposition       ED Disposition   Discharge    Condition   Stable    Comment   --               This medical record created using voice recognition software.             Dariel Jason MD  10/13/23 0557

## 2023-11-01 PROBLEM — K52.9 COLITIS: Status: RESOLVED | Noted: 2023-01-06 | Resolved: 2023-11-01

## 2023-11-01 PROBLEM — Z86.0100 PERSONAL HISTORY OF COLONIC POLYPS: Status: RESOLVED | Noted: 2023-01-06 | Resolved: 2023-11-01

## 2023-11-01 PROBLEM — Z86.010 PERSONAL HISTORY OF COLONIC POLYPS: Status: RESOLVED | Noted: 2023-01-06 | Resolved: 2023-11-01

## 2023-11-01 PROBLEM — R10.32 LEFT LOWER QUADRANT ABDOMINAL PAIN: Status: RESOLVED | Noted: 2023-01-06 | Resolved: 2023-11-01

## 2023-11-02 ENCOUNTER — OFFICE VISIT (OUTPATIENT)
Dept: INTERNAL MEDICINE | Facility: CLINIC | Age: 83
End: 2023-11-02
Payer: MEDICARE

## 2023-11-02 VITALS
WEIGHT: 130 LBS | BODY MASS INDEX: 23.92 KG/M2 | RESPIRATION RATE: 18 BRPM | HEIGHT: 62 IN | SYSTOLIC BLOOD PRESSURE: 116 MMHG | OXYGEN SATURATION: 95 % | TEMPERATURE: 98 F | DIASTOLIC BLOOD PRESSURE: 68 MMHG | HEART RATE: 72 BPM

## 2023-11-02 DIAGNOSIS — E55.9 VITAMIN D DEFICIENCY: ICD-10-CM

## 2023-11-02 DIAGNOSIS — Z79.4 TYPE 2 DIABETES MELLITUS WITHOUT COMPLICATION, WITH LONG-TERM CURRENT USE OF INSULIN: ICD-10-CM

## 2023-11-02 DIAGNOSIS — R53.83 OTHER FATIGUE: ICD-10-CM

## 2023-11-02 DIAGNOSIS — N18.32 STAGE 3B CHRONIC KIDNEY DISEASE: Primary | ICD-10-CM

## 2023-11-02 DIAGNOSIS — E11.9 TYPE 2 DIABETES MELLITUS WITHOUT COMPLICATION, WITH LONG-TERM CURRENT USE OF INSULIN: ICD-10-CM

## 2023-11-02 DIAGNOSIS — I25.9 IHD (ISCHEMIC HEART DISEASE): ICD-10-CM

## 2023-11-02 DIAGNOSIS — I10 PRIMARY HYPERTENSION: ICD-10-CM

## 2023-11-02 DIAGNOSIS — E78.2 MIXED HYPERLIPIDEMIA: ICD-10-CM

## 2023-11-02 DIAGNOSIS — I65.21 STENOSIS OF RIGHT CAROTID ARTERY: ICD-10-CM

## 2023-11-02 RX ORDER — ERGOCALCIFEROL 1.25 MG/1
50000 CAPSULE ORAL WEEKLY
Qty: 12 CAPSULE | Refills: 1 | Status: SHIPPED | OUTPATIENT
Start: 2023-11-02

## 2023-11-02 RX ORDER — ERGOCALCIFEROL 1.25 MG/1
50000 CAPSULE ORAL WEEKLY
Qty: 12 CAPSULE | Refills: 1 | Status: SHIPPED | OUTPATIENT
Start: 2023-11-02 | End: 2023-11-02 | Stop reason: SDUPTHER

## 2023-11-02 RX ORDER — EZETIMIBE 10 MG/1
10 TABLET ORAL DAILY
Qty: 90 TABLET | Refills: 1 | Status: SHIPPED | OUTPATIENT
Start: 2023-11-02

## 2023-11-02 NOTE — PROGRESS NOTES
"Chief Complaint  Hyperlipidemia (81 y/o female is here today to follow up on labs. No other issues at this time.)    Subjective          Lucía Thomas presents to [facility]     History of Present Illness  Patient very pleasant 81-year-old female with underlying hypertension, hyperlipidemia, resultant coronary artery disease prior CABG x2 with no MI, as well as underlying diabetes, who is coming in 11/23 for routine 6-month follow-up.  We will review her labs and make further recommendations at that time.    Review of Systems   Constitutional:  Negative for appetite change, fatigue and fever.   HENT:  Negative for congestion and ear pain.    Eyes:  Negative for blurred vision.   Respiratory:  Negative for cough, chest tightness, shortness of breath and wheezing.    Cardiovascular:  Negative for chest pain, palpitations and leg swelling.   Gastrointestinal:  Negative for abdominal pain.   Genitourinary:  Negative for difficulty urinating, dysuria and hematuria.   Musculoskeletal:  Negative for arthralgias and gait problem.   Skin:  Negative for skin lesions.   Neurological:  Negative for syncope, memory problem and confusion.   Psychiatric/Behavioral:  Negative for self-injury and depressed mood.        Objective   Vital Signs:   /68 (BP Location: Right arm, Patient Position: Sitting, Cuff Size: Adult)   Pulse 72   Temp 98 °F (36.7 °C) (Temporal)   Resp 18   Ht 157.5 cm (62.01\")   Wt 59 kg (130 lb)   SpO2 95%   BMI 23.77 kg/m²           Physical Exam  Vitals and nursing note reviewed.   Constitutional:       General: She is not in acute distress.     Appearance: Normal appearance. She is not toxic-appearing.   HENT:      Head: Atraumatic.      Right Ear: External ear normal.      Left Ear: External ear normal.      Nose: Nose normal.      Mouth/Throat:      Mouth: Mucous membranes are moist.   Eyes:      General:         Right eye: No discharge.         Left eye: " No discharge.      Extraocular Movements: Extraocular movements intact.      Pupils: Pupils are equal, round, and reactive to light.   Cardiovascular:      Rate and Rhythm: Normal rate and regular rhythm.      Pulses: Normal pulses.      Heart sounds: Normal heart sounds. No murmur heard.     No gallop.   Pulmonary:      Effort: Pulmonary effort is normal. No respiratory distress.      Breath sounds: No wheezing, rhonchi or rales.   Abdominal:      General: There is no distension.      Palpations: Abdomen is soft. There is no mass.      Tenderness: There is no abdominal tenderness. There is no guarding.   Musculoskeletal:         General: No swelling or tenderness.      Cervical back: No tenderness.      Right lower leg: No edema.      Left lower leg: No edema.   Skin:     General: Skin is warm and dry.      Findings: No rash.   Neurological:      General: No focal deficit present.      Mental Status: She is alert and oriented to person, place, and time. Mental status is at baseline.      Motor: No weakness.      Gait: Gait normal.   Psychiatric:         Mood and Affect: Mood normal.         Thought Content: Thought content normal.          Result Review :   The following data was reviewed by: Juan Alberto Genao MD on 10/19/2021:                  Assessment and Plan    Diagnoses and all orders for this visit:    1. Stage 3b chronic kidney disease (Primary)  Assessment & Plan:  GFR is stable around 35 as of her 11/23 OV.  She did get as low as 29 earlier this year.  Her potassium is hanging out between 4.5 and 5.  She is followed closely by Dr. Garcia, she was started on Farxiga and has not had any side effects other than perhaps fatigue.    Orders:  -     CBC & Differential; Future    2. Type 2 diabetes mellitus without complication, with long-term current use of insulin  Assessment & Plan:  A1c has trended down from 8.4 back in the spring to 7.3 as of her 11/23 office visit.  This was after being started on Farxiga.   Additionally she remains on full dose glipizide along with moderate dose Trulicity.  Certainly appropriate to continue same plan of care.  Remains with close f/u per Dr Peralta.    Orders:  -     Hemoglobin A1c; Future    3. Primary hypertension  Assessment & Plan:  Blood pressure remains well controlled as of her 11/23 office visit.  This was despite discontinuing the losartan secondary to progressive CKD/hyperkalemia.  Patient's pulse is in the low 70s, so she stable to continue with just low-dose metoprolol.    Orders:  -     Comprehensive Metabolic Panel; Future    4. Mixed hyperlipidemia  Assessment & Plan:  LDL has trended up from 50 to 83 and now to 112 as of her 11/23 office visit.  Patient is very compliant with moderate dose Crestor, she takes this in the evening.  She has noticed increase in her sugar with titration of statins in the past.  Discussed with patient would be appropriate then to add Zetia instead.  She recalls being on this before, did not seem to be as beneficial, but she will get on this about 3 months before her next set of labs.    Orders:  -     Lipid Panel; Future    5. Stenosis of right carotid artery  Overview:  Carotids 10/20:  Bilateral carotid artery stenosis in the range of 50-69%.   Bilateral vertebral arteries with antegrade flow.       Assessment & Plan:  Patient is asymptomatic from this standpoint, but I reviewed with her its been 3 years now, and certainly there could have been significant progression of the above stenosis.  Patient reports she is not interested in monitoring this going forward, she says Dr. Gibson mention the need to evaluate this further at the recent appointment.  Patient is aware of increased risk for stroke, but reports she would not want surgical intervention for this.  Hopefully if she develops symptoms down the road she will let us know in a timely manner.  Otherwise we will continue with aggressive cholesterol reduction as well as aspirin  therapy.      6. IHD (ischemic heart disease)  Overview:  She is followed by Dr Lopez every year or so.    Assessment & Plan:  Patient followed closely by cardiology, and is felt to been stable this regards.  She did have an episode of nonischemic chest pain, got it checked out in the ER, and was unremarkable.  Had been related to some increased activity etc.  Patient stable to continue with baby aspirin and low-dose beta-blocker for antianginal benefit.      7. Other fatigue  -     TSH; Future  -     Vitamin B12 anemia; Future  -     Folate anemia; Future    8. Vitamin D deficiency  -     Vitamin D,25-Hydroxy; Future    Other orders  -     ezetimibe (Zetia) 10 MG tablet; Take 1 tablet by mouth Daily.  Dispense: 90 tablet; Refill: 1  -     vitamin D (ERGOCALCIFEROL) 1.25 MG (22397 UT) capsule capsule; Take 1 capsule by mouth 1 (One) Time Per Week.  Dispense: 12 capsule; Refill: 1         --  --  VISIT 4/21:  ANNUAL MEDICARE WELLNESS EXAM 4/21 =reviewed all forms with pt in office; no new discoveries; Joseph neg.  --  CAD/CABG 2V '04...s/p cath 4/12...per Dr Shreman; ECHO with EF 60%...10/19 has no ischemia with treadmill up to 2 miles qd or gets outside; cards is only going to see PRN as of 4/19 OV=stress ONLY if sxs---> no sxs 10/20 and saw Dr Lopez recently=stable carotids/neg holter/ECHO stable.    PVD with R carotid lesion 60-79% '09...stable 9/14... 80-90% and has f/u with Dr MARANDA Peralta for this...CTA = 75% and he will follow with dopplers since she doesn't want surgery...was stable/? better 10/17 per Dr Sabino Peralta with f/u later this fall '18...she doesn't want it followed anymore because she is against any surgery unless sxs/etc; is c/w ASA=we d/w this 4/20.    LIPIDS per endo with LDL 64 (9/16)...34 is same 4/20---> 54 is goal 4/21.  --  HTN remains well controlled.  CKD3 = 50% in 10/19 is not bad... > 60%...40% is out of no where; she does not appear dry; had U/S 7/20 per  Self=neg; I rec repeat  in 3 mo in [place]---> 50% as of 4/21 OV is nice to see.  --  DM per endo=Dr Peralta...7.8 (1/15)...invokana instead of victoza now (no help januvia/invokana/elevated lipase with victoza)...8.5 with change to glipizide noted...7.8 is holding and I rec Lantus for tighter control if she does not want any treatment for progressive PVD...7.3 is good trend. (TSH neg 4/17)...6.4...7.7... 7.5 and needs better control given below=d/w Endo ? Lantus 15 qd...7.5 is stuck 4/20 = defer to Endo=they/ I am happy with this ballpark---> 7.3 in 10/20.  PROTEINURIA = 40 to 170 at 4/17 OV and will try to titrate ARB from 25 to 37 and then 50 if BP allows...103 is mild bump and ? related to A1C, so will repeat in 6 mo to ensure no rapid progression...247 and will increase to 75 mg now; call if dizzy; d/w needs tighter BS control; continue 6 mo f/u...good drop and no more room with BP anyhow---> better still 10/20 = ditto 7/3 in 4/21.  --  ANEMIA = down 13.2 to 11.4 past 2 years; rec MWF iron, cologuard, and call...stuck at 11.8 with lowish iron=d/w again to take q MWF=she only took it for a few weeks as of 4/20 OV...11.4 and I d/w again 10/20 that she should stay on the iron and take B12 for level 240=11.3 with same recs 4/21.  GERD at 4/18 OV and I rec pepcid q PM.  --  R LEG=11/19 twisted it, 12/19 = pain, 1/20 = pain but refused meds, 1/30/20 fell on face, 2/20 = knee xray neg, LS spine=spurs L1-4 and went to PT and they manipulated her hip and made it worse; I want her on gabapentin for the spurs=pain down leg into knee, but she is focused on the knee, so will get MRI for her and take it from there---> had tear meniscus, did not like Dr Mich, she refused therapy, went to PT a month; no daily meds, occ tylenol.  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  --  OSTEOPENIA...BMD 7/12...spine -0.8, hip -1.4...12/17 = spine -1.3, hip -1.6; d/w po Ca+ = tums/etc---> BMD 10/20 =  spine -0.4, hip - 1.5 = no tx needed.  VIT D DEF and needs tx now=50K to start...38 is stable---> 31.  --  --  BREAST CA s/p L lump '87.  MMG 7/19/23 at [facility] per me.   COLON 1/23 = 4 polyps = no more per Dr Andrews.  Pneumovax x 2; Zostavax '13; Prevnar '16; Shingrix x 2.  (, my pt Bruce, 3 kids with Dr Turcios=daughter; has place in [place] now and goes for 3 months q winter---> to go for 6 months '19). --  --    Follow Up   Return in about 6 months (around 5/2/2024).  Patient was given instructions and counseling regarding her condition or for health maintenance advice. Please see specific information pulled into the AVS if appropriate.

## 2023-11-02 NOTE — ASSESSMENT & PLAN NOTE
A1c has trended down from 8.4 back in the spring to 7.3 as of her 11/23 office visit.  This was after being started on Farxiga.  Additionally she remains on full dose glipizide along with moderate dose Trulicity.  Certainly appropriate to continue same plan of care.  Remains with close f/u per Dr Peralta.

## 2023-11-02 NOTE — ASSESSMENT & PLAN NOTE
Patient followed closely by cardiology, and is felt to been stable this regards.  She did have an episode of nonischemic chest pain, got it checked out in the ER, and was unremarkable.  Had been related to some increased activity etc.  Patient stable to continue with baby aspirin and low-dose beta-blocker for antianginal benefit.

## 2023-11-02 NOTE — ASSESSMENT & PLAN NOTE
LDL has trended up from 50 to 83 and now to 112 as of her 11/23 office visit.  Patient is very compliant with moderate dose Crestor, she takes this in the evening.  She has noticed increase in her sugar with titration of statins in the past.  Discussed with patient would be appropriate then to add Zetia instead.  She recalls being on this before, did not seem to be as beneficial, but she will get on this about 3 months before her next set of labs.

## 2023-11-02 NOTE — ASSESSMENT & PLAN NOTE
GFR is stable around 35 as of her 11/23 OV.  She did get as low as 29 earlier this year.  Her potassium is hanging out between 4.5 and 5.  She is followed closely by Dr. Garcia, she was started on Farxiga and has not had any side effects other than perhaps fatigue.

## 2023-11-02 NOTE — ASSESSMENT & PLAN NOTE
Blood pressure remains well controlled as of her 11/23 office visit.  This was despite discontinuing the losartan secondary to progressive CKD/hyperkalemia.  Patient's pulse is in the low 70s, so she stable to continue with just low-dose metoprolol.

## 2024-01-22 RX ORDER — METOPROLOL SUCCINATE 50 MG/1
TABLET, EXTENDED RELEASE ORAL
Qty: 135 TABLET | Refills: 1 | Status: SHIPPED | OUTPATIENT
Start: 2024-01-22

## 2024-03-28 NOTE — ASSESSMENT & PLAN NOTE
Patient is asymptomatic from this standpoint, but I reviewed with her its been 3 years now, and certainly there could have been significant progression of the above stenosis.  Patient reports she is not interested in monitoring this going forward, she says Dr. Gibson mention the need to evaluate this further at the recent appointment.  Patient is aware of increased risk for stroke, but reports she would not want surgical intervention for this.  Hopefully if she develops symptoms down the road she will let us know in a timely manner.  Otherwise we will continue with aggressive cholesterol reduction as well as aspirin therapy.   [phone]

## 2024-04-17 ENCOUNTER — TRANSCRIBE ORDERS (OUTPATIENT)
Dept: LAB | Facility: HOSPITAL | Age: 84
End: 2024-04-17
Payer: MEDICARE

## 2024-04-17 ENCOUNTER — LAB (OUTPATIENT)
Dept: LAB | Facility: HOSPITAL | Age: 84
End: 2024-04-17
Payer: MEDICARE

## 2024-04-17 DIAGNOSIS — I10 PRIMARY HYPERTENSION: ICD-10-CM

## 2024-04-17 DIAGNOSIS — Z79.4 TYPE 2 DIABETES MELLITUS WITHOUT COMPLICATION, WITH LONG-TERM CURRENT USE OF INSULIN: ICD-10-CM

## 2024-04-17 DIAGNOSIS — I10 HYPERTENSION, UNSPECIFIED TYPE: ICD-10-CM

## 2024-04-17 DIAGNOSIS — R53.83 OTHER FATIGUE: ICD-10-CM

## 2024-04-17 DIAGNOSIS — E11.9 DIABETES MELLITUS WITHOUT COMPLICATION: ICD-10-CM

## 2024-04-17 DIAGNOSIS — E11.9 TYPE 2 DIABETES MELLITUS WITHOUT COMPLICATION, WITH LONG-TERM CURRENT USE OF INSULIN: ICD-10-CM

## 2024-04-17 DIAGNOSIS — N18.32 CHRONIC KIDNEY DISEASE (CKD) STAGE G3B/A1, MODERATELY DECREASED GLOMERULAR FILTRATION RATE (GFR) BETWEEN 30-44 ML/MIN/1.73 SQUARE METER AND ALBUMINURIA CREATININE RATIO LESS THAN 30 MG/G (CMS/H*: Primary | ICD-10-CM

## 2024-04-17 DIAGNOSIS — N18.32 CHRONIC KIDNEY DISEASE (CKD) STAGE G3B/A1, MODERATELY DECREASED GLOMERULAR FILTRATION RATE (GFR) BETWEEN 30-44 ML/MIN/1.73 SQUARE METER AND ALBUMINURIA CREATININE RATIO LESS THAN 30 MG/G (CMS/H*: ICD-10-CM

## 2024-04-17 DIAGNOSIS — E53.8 BIOTIN-(PROPIONYL-COA-CARBOXYLASE) LIGASE DEFICIENCY: ICD-10-CM

## 2024-04-17 DIAGNOSIS — E55.9 VITAMIN D DEFICIENCY: ICD-10-CM

## 2024-04-17 DIAGNOSIS — I12.9 HYPERTENSIVE NEPHROPATHY: ICD-10-CM

## 2024-04-17 DIAGNOSIS — R80.9 PROTEINURIA, UNSPECIFIED TYPE: ICD-10-CM

## 2024-04-17 DIAGNOSIS — E11.9 DIABETES MELLITUS WITHOUT COMPLICATION: Primary | ICD-10-CM

## 2024-04-17 DIAGNOSIS — E78.2 MIXED HYPERLIPIDEMIA: ICD-10-CM

## 2024-04-17 DIAGNOSIS — N18.32 STAGE 3B CHRONIC KIDNEY DISEASE: ICD-10-CM

## 2024-04-17 LAB
25(OH)D3 SERPL-MCNC: 52.3 NG/ML (ref 30–100)
ALBUMIN SERPL-MCNC: 4.1 G/DL (ref 3.5–5.2)
ALBUMIN/GLOB SERPL: 1.5 G/DL
ALP SERPL-CCNC: 66 U/L (ref 39–117)
ALT SERPL W P-5'-P-CCNC: 19 U/L (ref 1–33)
ANION GAP SERPL CALCULATED.3IONS-SCNC: 11.6 MMOL/L (ref 5–15)
AST SERPL-CCNC: 19 U/L (ref 1–32)
BACTERIA UR QL AUTO: ABNORMAL /HPF
BASOPHILS # BLD AUTO: 0.03 10*3/MM3 (ref 0–0.2)
BASOPHILS NFR BLD AUTO: 0.6 % (ref 0–1.5)
BILIRUB SERPL-MCNC: 0.5 MG/DL (ref 0–1.2)
BILIRUB UR QL STRIP: NEGATIVE
BUN SERPL-MCNC: 13 MG/DL (ref 8–23)
BUN/CREAT SERPL: 8.7 (ref 7–25)
CALCIUM SPEC-SCNC: 9.2 MG/DL (ref 8.6–10.5)
CHLORIDE SERPL-SCNC: 103 MMOL/L (ref 98–107)
CHOLEST SERPL-MCNC: 141 MG/DL (ref 0–200)
CLARITY UR: CLEAR
CO2 SERPL-SCNC: 24.4 MMOL/L (ref 22–29)
COLOR UR: YELLOW
CREAT SERPL-MCNC: 1.5 MG/DL (ref 0.57–1)
DEPRECATED RDW RBC AUTO: 42.1 FL (ref 37–54)
EGFRCR SERPLBLD CKD-EPI 2021: 34.6 ML/MIN/1.73
EOSINOPHIL # BLD AUTO: 0.28 10*3/MM3 (ref 0–0.4)
EOSINOPHIL NFR BLD AUTO: 6 % (ref 0.3–6.2)
ERYTHROCYTE [DISTWIDTH] IN BLOOD BY AUTOMATED COUNT: 12.7 % (ref 12.3–15.4)
FOLATE SERPL-MCNC: >20 NG/ML (ref 4.78–24.2)
GLOBULIN UR ELPH-MCNC: 2.8 GM/DL
GLUCOSE SERPL-MCNC: 147 MG/DL (ref 65–99)
GLUCOSE UR STRIP-MCNC: NEGATIVE MG/DL
HBA1C MFR BLD: 7.4 % (ref 4.8–5.6)
HCT VFR BLD AUTO: 37 % (ref 34–46.6)
HDLC SERPL-MCNC: 40 MG/DL (ref 40–60)
HGB BLD-MCNC: 12 G/DL (ref 12–15.9)
HGB UR QL STRIP.AUTO: NEGATIVE
HYALINE CASTS UR QL AUTO: ABNORMAL /LPF
IMM GRANULOCYTES # BLD AUTO: 0.02 10*3/MM3 (ref 0–0.05)
IMM GRANULOCYTES NFR BLD AUTO: 0.4 % (ref 0–0.5)
KETONES UR QL STRIP: NEGATIVE
LDLC SERPL CALC-MCNC: 71 MG/DL (ref 0–100)
LDLC/HDLC SERPL: 1.63 {RATIO}
LEUKOCYTE ESTERASE UR QL STRIP.AUTO: ABNORMAL
LYMPHOCYTES # BLD AUTO: 1.47 10*3/MM3 (ref 0.7–3.1)
LYMPHOCYTES NFR BLD AUTO: 31.5 % (ref 19.6–45.3)
MCH RBC QN AUTO: 29.2 PG (ref 26.6–33)
MCHC RBC AUTO-ENTMCNC: 32.4 G/DL (ref 31.5–35.7)
MCV RBC AUTO: 90 FL (ref 79–97)
MONOCYTES # BLD AUTO: 0.37 10*3/MM3 (ref 0.1–0.9)
MONOCYTES NFR BLD AUTO: 7.9 % (ref 5–12)
NEUTROPHILS NFR BLD AUTO: 2.5 10*3/MM3 (ref 1.7–7)
NEUTROPHILS NFR BLD AUTO: 53.6 % (ref 42.7–76)
NITRITE UR QL STRIP: NEGATIVE
NRBC BLD AUTO-RTO: 0 /100 WBC (ref 0–0.2)
PH UR STRIP.AUTO: 6 [PH] (ref 5–8)
PHOSPHATE SERPL-MCNC: 3.7 MG/DL (ref 2.5–4.5)
PLATELET # BLD AUTO: 218 10*3/MM3 (ref 140–450)
PMV BLD AUTO: 11.2 FL (ref 6–12)
POTASSIUM SERPL-SCNC: 4.5 MMOL/L (ref 3.5–5.2)
PROT SERPL-MCNC: 6.9 G/DL (ref 6–8.5)
PROT UR QL STRIP: ABNORMAL
PTH-INTACT SERPL-MCNC: 60.2 PG/ML (ref 15–65)
RBC # BLD AUTO: 4.11 10*6/MM3 (ref 3.77–5.28)
RBC # UR STRIP: ABNORMAL /HPF
REF LAB TEST METHOD: ABNORMAL
SODIUM SERPL-SCNC: 139 MMOL/L (ref 136–145)
SP GR UR STRIP: 1.01 (ref 1–1.03)
SQUAMOUS #/AREA URNS HPF: ABNORMAL /HPF
TRIGL SERPL-MCNC: 179 MG/DL (ref 0–150)
TSH SERPL DL<=0.05 MIU/L-ACNC: 2.97 UIU/ML (ref 0.27–4.2)
URATE SERPL-MCNC: 4.1 MG/DL (ref 2.4–5.7)
UROBILINOGEN UR QL STRIP: ABNORMAL
VIT B12 BLD-MCNC: 675 PG/ML (ref 211–946)
VLDLC SERPL-MCNC: 30 MG/DL (ref 5–40)
WBC # UR STRIP: ABNORMAL /HPF
WBC NRBC COR # BLD AUTO: 4.67 10*3/MM3 (ref 3.4–10.8)

## 2024-04-17 PROCEDURE — 83036 HEMOGLOBIN GLYCOSYLATED A1C: CPT

## 2024-04-17 PROCEDURE — 80053 COMPREHEN METABOLIC PANEL: CPT

## 2024-04-17 PROCEDURE — 82607 VITAMIN B-12: CPT

## 2024-04-17 PROCEDURE — 85025 COMPLETE CBC W/AUTO DIFF WBC: CPT

## 2024-04-17 PROCEDURE — 36415 COLL VENOUS BLD VENIPUNCTURE: CPT

## 2024-04-17 PROCEDURE — 83970 ASSAY OF PARATHORMONE: CPT

## 2024-04-17 PROCEDURE — 81001 URINALYSIS AUTO W/SCOPE: CPT

## 2024-04-17 PROCEDURE — 82306 VITAMIN D 25 HYDROXY: CPT

## 2024-04-17 PROCEDURE — 84443 ASSAY THYROID STIM HORMONE: CPT

## 2024-04-17 PROCEDURE — 84550 ASSAY OF BLOOD/URIC ACID: CPT

## 2024-04-17 PROCEDURE — 82746 ASSAY OF FOLIC ACID SERUM: CPT

## 2024-04-17 PROCEDURE — 80061 LIPID PANEL: CPT

## 2024-04-17 PROCEDURE — 84100 ASSAY OF PHOSPHORUS: CPT

## 2024-04-24 ENCOUNTER — OFFICE VISIT (OUTPATIENT)
Dept: INTERNAL MEDICINE | Age: 84
End: 2024-04-24
Payer: MEDICARE

## 2024-04-24 VITALS
WEIGHT: 131.6 LBS | HEIGHT: 62 IN | HEART RATE: 82 BPM | OXYGEN SATURATION: 98 % | SYSTOLIC BLOOD PRESSURE: 120 MMHG | TEMPERATURE: 97.8 F | BODY MASS INDEX: 24.22 KG/M2 | DIASTOLIC BLOOD PRESSURE: 54 MMHG

## 2024-04-24 DIAGNOSIS — I10 PRIMARY HYPERTENSION: ICD-10-CM

## 2024-04-24 DIAGNOSIS — Z12.31 BREAST CANCER SCREENING BY MAMMOGRAM: ICD-10-CM

## 2024-04-24 DIAGNOSIS — E55.9 VITAMIN D DEFICIENCY: ICD-10-CM

## 2024-04-24 DIAGNOSIS — G89.29 CHRONIC PAIN OF LEFT KNEE: ICD-10-CM

## 2024-04-24 DIAGNOSIS — E11.9 TYPE 2 DIABETES MELLITUS WITHOUT COMPLICATION, WITH LONG-TERM CURRENT USE OF INSULIN: ICD-10-CM

## 2024-04-24 DIAGNOSIS — Z00.00 MEDICARE ANNUAL WELLNESS VISIT, SUBSEQUENT: Primary | ICD-10-CM

## 2024-04-24 DIAGNOSIS — Z79.4 TYPE 2 DIABETES MELLITUS WITHOUT COMPLICATION, WITH LONG-TERM CURRENT USE OF INSULIN: ICD-10-CM

## 2024-04-24 DIAGNOSIS — M25.562 CHRONIC PAIN OF LEFT KNEE: ICD-10-CM

## 2024-04-24 DIAGNOSIS — D53.1 MEGALOBLASTIC ANEMIA DUE TO VITAMIN B12 DEFICIENCY: ICD-10-CM

## 2024-04-24 DIAGNOSIS — N18.32 STAGE 3B CHRONIC KIDNEY DISEASE: ICD-10-CM

## 2024-04-24 DIAGNOSIS — E78.2 MIXED HYPERLIPIDEMIA: ICD-10-CM

## 2024-04-24 PROCEDURE — 3078F DIAST BP <80 MM HG: CPT | Performed by: INTERNAL MEDICINE

## 2024-04-24 PROCEDURE — 1160F RVW MEDS BY RX/DR IN RCRD: CPT | Performed by: INTERNAL MEDICINE

## 2024-04-24 PROCEDURE — 1170F FXNL STATUS ASSESSED: CPT | Performed by: INTERNAL MEDICINE

## 2024-04-24 PROCEDURE — G0439 PPPS, SUBSEQ VISIT: HCPCS | Performed by: INTERNAL MEDICINE

## 2024-04-24 PROCEDURE — 99214 OFFICE O/P EST MOD 30 MIN: CPT | Performed by: INTERNAL MEDICINE

## 2024-04-24 PROCEDURE — 3074F SYST BP LT 130 MM HG: CPT | Performed by: INTERNAL MEDICINE

## 2024-04-24 RX ORDER — LOSARTAN POTASSIUM 25 MG/1
1 TABLET ORAL DAILY
COMMUNITY
Start: 2023-10-23

## 2024-04-24 NOTE — ASSESSMENT & PLAN NOTE
AWV completed 4/24.  Patient remains active and independent.  No falls or hospitalizations in the past year.  Does not have advanced directive, however she does have one of her children designated as her medical POA (Dr Turcios).

## 2024-04-24 NOTE — ASSESSMENT & PLAN NOTE
LDL is down nicely from 112/71 as of her 4/24 OV.  This was after adding Zetia to her moderate dose Crestor, certainly appropriate to continue same.

## 2024-04-24 NOTE — ASSESSMENT & PLAN NOTE
A1c is very stable at 7.4 as of her 4/24 OV.  She is stable despite having to discontinue Farxiga.  She is on full dose glipizide along with moderate dose Trulicity, saw Dr. Peralta just yesterday, recommendations were to continue current plan of care.

## 2024-04-24 NOTE — ASSESSMENT & PLAN NOTE
Vitamin D is fine at 52 as of 4/24 OV.  She is on weekly prescription dosing, stable to continue same.

## 2024-04-24 NOTE — ASSESSMENT & PLAN NOTE
GFR is very stable at 35 as of her 4/24 OV.  Electrolytes are normal, no acidosis.    Of note, she did discontinue Farxiga recently due to recurrent UTI/yeast infections.  She was on 10 mg dose at the time, she will discuss with nephrology next week possible indications for trying 5 mg going forward.

## 2024-04-24 NOTE — PROGRESS NOTES
The ABCs of the Annual Wellness Visit  Subsequent Medicare Wellness Visit    Subjective      Lucía Thomas is a 83 y.o. female who presents for a Subsequent Medicare Wellness Visit.    The following portions of the patient's history were reviewed and   updated as appropriate: allergies, current medications, past family history, past medical history, past social history, past surgical history, and problem list.    Compared to one year ago, the patient feels her physical   health is the same.    Compared to one year ago, the patient feels her mental   health is the same.    Recent Hospitalizations:  She was not admitted to the hospital during the last year.       Current Medical Providers:  Patient Care Team:  Juan Alberto Genao MD as PCP - General (Internal Medicine)    Outpatient Medications Prior to Visit   Medication Sig Dispense Refill    Accu-Chek SmartView test strip 2 (Two) Times a Day. test blood sugar      Aspirin Low Dose 81 MG EC tablet Take 1 tablet by mouth Every Evening.      ezetimibe (Zetia) 10 MG tablet Take 1 tablet by mouth Daily. 90 tablet 1    glipizide (GLUCOTROL) 10 MG tablet Take 1 tablet by mouth 2 (Two) Times a Day Before Meals. PT takes 5mg BID      losartan (COZAAR) 25 MG tablet Take 1 tablet by mouth Daily.      metoprolol succinate XL (TOPROL-XL) 50 MG 24 hr tablet TAKE ONE AND ONE-HALF tablets BY MOUTH EVERY  tablet 1    rosuvastatin (CRESTOR) 20 MG tablet Take 1 tablet by mouth Daily.      Trulicity 3 MG/0.5ML solution pen-injector Inject 3 mg under the skin every 7 (seven) days.      vitamin D (ERGOCALCIFEROL) 1.25 MG (18589 UT) capsule capsule Take 1 capsule by mouth 1 (One) Time Per Week. 12 capsule 1    Farxiga 10 MG tablet Take 10 mg by mouth Every Morning. Prescribed by Dr Ken Zavaleta [place]       No facility-administered medications prior to visit.       No opioid medication identified on active medication list. I have reviewed chart for other potential  high risk  "medication/s and harmful drug interactions in the elderly.        Aspirin is on active medication list. Aspirin use is indicated based on review of current medical condition/s. Pros and cons of this therapy have been discussed today. Benefits of this medication outweigh potential harm.  Patient has been encouraged to continue taking this medication.  .      Patient Active Problem List   Diagnosis    IHD (ischemic heart disease)    Megaloblastic anemia due to vitamin B12 deficiency    Type 2 diabetes mellitus without complication, with long-term current use of insulin    Primary hypertension    Mixed hyperlipidemia    Vitamin D deficiency    Atherosclerosis of coronary artery bypass graft    Stenosis of right carotid artery    Stage 3b chronic kidney disease    Medicare annual wellness visit, subsequent    Vaginal candidiasis     Advance Care Planning   Advance Care Planning     Advance Directive is not on file.  ACP discussion was held with the patient during this visit. Patient does not have an advance directive, declines further assistance.     Objective    Vitals:    04/24/24 0919   BP: 120/54   Pulse: 82   Temp: 97.8 °F (36.6 °C)   TempSrc: Skin   SpO2: 98%   Weight: 59.7 kg (131 lb 9.6 oz)   Height: 157.5 cm (62.01\")     Estimated body mass index is 24.06 kg/m² as calculated from the following:    Height as of this encounter: 157.5 cm (62.01\").    Weight as of this encounter: 59.7 kg (131 lb 9.6 oz).    BMI is within normal parameters. No other follow-up for BMI required.      Does the patient have evidence of cognitive impairment?   No    Lab Results   Component Value Date    TRIG 179 (H) 04/17/2024    HDL 40 04/17/2024    LDL 71 04/17/2024    VLDL 30 04/17/2024    HGBA1C 7.40 (H) 04/17/2024          HEALTH RISK ASSESSMENT    Smoking Status:  Social History     Tobacco Use   Smoking Status Never   Smokeless Tobacco Never     Alcohol Consumption:  Social History     Substance and Sexual Activity   Alcohol Use " Never     Fall Risk Screen:    ERNSTADI Fall Risk Assessment was completed, and patient is at LOW risk for falls.Assessment completed on:2024    Depression Screenin/24/2024     9:18 AM   PHQ-2/PHQ-9 Depression Screening   Little Interest or Pleasure in Doing Things 0-->not at all   Feeling Down, Depressed or Hopeless 0-->not at all   PHQ-9: Brief Depression Severity Measure Score 0       Health Habits and Functional and Cognitive Screenin/24/2024     9:00 AM   Functional & Cognitive Status   Do you have difficulty preparing food and eating? No   Do you have difficulty bathing yourself, getting dressed or grooming yourself? No   Do you have difficulty using the toilet? No   Do you have difficulty moving around from place to place? No   Do you have trouble with steps or getting out of a bed or a chair? No   Current Diet Well Balanced Diet   Do you need help using the phone?  No   Are you deaf or do you have serious difficulty hearing?  No   Do you need help to go to places out of walking distance? No   Do you need help shopping? No   Do you need help preparing meals?  No   Do you need help with housework?  No   Do you need help with laundry? No   Do you need help taking your medications? No   Do you need help managing money? No   Do you ever drive or ride in a car without wearing a seat belt? No   Do you have difficulty concentrating, remembering or making decisions? No       Age-appropriate Screening Schedule:  Refer to the list below for future screening recommendations based on patient's age, sex and/or medical conditions. Orders for these recommended tests are listed in the plan section. The patient has been provided with a written plan.    Health Maintenance   Topic Date Due    TDAP/TD VACCINES (1 - Tdap) Never done    RSV Vaccine - Adults (1 - 1-dose 60+ series) Never done    DIABETIC EYE EXAM  2024    INFLUENZA VACCINE  2024    URINE MICROALBUMIN  10/10/2024    HEMOGLOBIN A1C   10/18/2024    LIPID PANEL  04/17/2025    ANNUAL WELLNESS VISIT  04/24/2025    DXA SCAN  07/19/2025    Pneumococcal Vaccine 65+  Completed    ZOSTER VACCINE  Completed    COVID-19 Vaccine  Discontinued                  CMS Preventative Services Quick Reference  Risk Factors Identified During Encounter:    None Identified    The above risks/problems have been discussed with the patient.  Pertinent information has been shared with the patient in the After Visit Summary.    Diagnoses and all orders for this visit:    1. Medicare annual wellness visit, subsequent (Primary)  Assessment & Plan:  AWV completed 4/24.  Patient remains active and independent.  No falls or hospitalizations in the past year.  Does not have advanced directive, however she does have one of her children designated as her medical POA (Dr Turcios).      2. Type 2 diabetes mellitus without complication, with long-term current use of insulin  Assessment & Plan:  A1c is very stable at 7.4 as of her 4/24 OV.  She is stable despite having to discontinue Farxiga.  She is on full dose glipizide along with moderate dose Trulicity, saw Dr. Peralta just yesterday, recommendations were to continue current plan of care.      3. Primary hypertension  Assessment & Plan:  Blood pressure remains well controlled as of her 4/24 office visit. This was despite discontinuing the losartan secondary to progressive CKD/hyperkalemia. Patient's pulse is in the 80s, so she stable to continue with just low-dose metoprolol       4. Mixed hyperlipidemia  Assessment & Plan:  LDL is down nicely from 112/71 as of her 4/24 OV.  This was after adding Zetia to her moderate dose Crestor, certainly appropriate to continue same.      5. Stage 3b chronic kidney disease  Assessment & Plan:  GFR is very stable at 35 as of her 4/24 OV.  Electrolytes are normal, no acidosis.    Of note, she did discontinue Farxiga recently due to recurrent UTI/yeast infections.  She was on 10 mg dose at the time,  she will discuss with nephrology next week possible indications for trying 5 mg going forward.      6. 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      7. Vitamin D deficiency  Assessment & Plan:  Vitamin D is fine at 52 as of 4/24 OV.  She is on weekly prescription dosing, stable to continue same.      8. Breast cancer screening by mammogram  -     Mammo Screening Digital Tomosynthesis Bilateral With CAD; Future        Follow Up:   Next Medicare Wellness visit to be scheduled in 1 year.      An After Visit Summary and PPPS were made available to the patient.

## 2024-04-24 NOTE — PROGRESS NOTES
"Chief Complaint  Hyperlipidemia, Follow-up (Pt states that this is routine, she had labs.), and Left knee pain (Pt was walking and her left knee buckled. She iced it and rested and did some light exercises she can walk on it, but feels something is wrong. )    Subjective          Lucía Thomas presents to [facility]     Hyperlipidemia  Pertinent negatives include no chest pain or shortness of breath.     History of present illness:  Patient very pleasant 83-year-old female with underlying hypertension, hyperlipidemia, resultant coronary artery disease prior CABG x2 with no MI, as well as underlying diabetes, who is coming in 4/24 for routine 6-month follow-up.  We will review her labs and make further recommendations at that time.    Review of Systems   Constitutional:  Negative for appetite change, fatigue and fever.   HENT:  Negative for congestion and ear pain.    Eyes:  Negative for blurred vision.   Respiratory:  Negative for cough, chest tightness, shortness of breath and wheezing.    Cardiovascular:  Negative for chest pain, palpitations and leg swelling.   Gastrointestinal:  Negative for abdominal pain.   Genitourinary:  Negative for difficulty urinating, dysuria and hematuria.   Musculoskeletal:  Negative for arthralgias and gait problem.   Skin:  Negative for skin lesions.   Neurological:  Negative for syncope, memory problem and confusion.   Psychiatric/Behavioral:  Negative for self-injury and depressed mood.        Objective   Vital Signs:   /54   Pulse 82   Temp 97.8 °F (36.6 °C) (Skin)   Ht 157.5 cm (62.01\")   Wt 59.7 kg (131 lb 9.6 oz)   SpO2 98%   BMI 24.06 kg/m²           Physical Exam  Vitals and nursing note reviewed.   Constitutional:       General: She is not in acute distress.     Appearance: Normal appearance. She is not toxic-appearing.   HENT:      Head: Atraumatic.      Right Ear: External ear normal.      Left Ear: External ear normal. "      Nose: Nose normal.      Mouth/Throat:      Mouth: Mucous membranes are moist.   Eyes:      General:         Right eye: No discharge.         Left eye: No discharge.      Extraocular Movements: Extraocular movements intact.      Pupils: Pupils are equal, round, and reactive to light.   Cardiovascular:      Rate and Rhythm: Normal rate and regular rhythm.      Pulses: Normal pulses.      Heart sounds: Normal heart sounds. No murmur heard.     No gallop.   Pulmonary:      Effort: Pulmonary effort is normal. No respiratory distress.      Breath sounds: No wheezing, rhonchi or rales.   Abdominal:      General: There is no distension.      Palpations: Abdomen is soft. There is no mass.      Tenderness: There is no abdominal tenderness. There is no guarding.   Musculoskeletal:         General: No swelling or tenderness.      Cervical back: No tenderness.      Right lower leg: No edema.      Left lower leg: No edema.   Skin:     General: Skin is warm and dry.      Findings: No rash.   Neurological:      General: No focal deficit present.      Mental Status: She is alert and oriented to person, place, and time. Mental status is at baseline.      Motor: No weakness.      Gait: Gait normal.   Psychiatric:         Mood and Affect: Mood normal.         Thought Content: Thought content normal.          Result Review :   The following data was reviewed by: Juan Alberto Genao MD on 10/19/2021:                  Assessment and Plan    Diagnoses and all orders for this visit:    1. Medicare annual wellness visit, subsequent (Primary)  Assessment & Plan:  AWV completed 4/24.  Patient remains active and independent.  No falls or hospitalizations in the past year.  Does not have advanced directive, however she does have one of her children designated as her medical POA (Dr Turcios).    Orders:  -     TSH; Future    2. Type 2 diabetes mellitus without complication, with long-term current use of insulin  Assessment & Plan:  A1c is very stable  at 7.4 as of her 4/24 OV.  She is stable despite having to discontinue Farxiga.  She is on full dose glipizide along with moderate dose Trulicity, saw Dr. Peralta just yesterday, recommendations were to continue current plan of care.    Orders:  -     Hemoglobin A1c; Future    3. Primary hypertension  Assessment & Plan:  Blood pressure remains well controlled as of her 4/24 office visit. This was despite discontinuing the losartan secondary to progressive CKD/hyperkalemia. Patient's pulse is in the 80s, so she stable to continue with just low-dose metoprolol     Orders:  -     Comprehensive Metabolic Panel; Future    4. Mixed hyperlipidemia  Assessment & Plan:  LDL is down nicely from 112/71 as of her 4/24 OV.  This was after adding Zetia to her moderate dose Crestor, certainly appropriate to continue same.    Orders:  -     Lipid Panel; Future    5. Stage 3b chronic kidney disease  Assessment & Plan:  GFR is very stable at 35 as of her 4/24 OV.  Electrolytes are normal, no acidosis.    Of note, she did discontinue Farxiga recently due to recurrent UTI/yeast infections.  She was on 10 mg dose at the time, she will discuss with nephrology next week possible indications for trying 5 mg going forward.    Orders:  -     CBC & Differential; Future    6. 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      7. Vitamin D deficiency  Assessment & Plan:  Vitamin D is fine at 52 as of 4/24 OV.  She is on weekly prescription dosing, stable to continue same.      8. Breast cancer screening by mammogram  -     Mammo Screening Digital Tomosynthesis Bilateral With CAD; Future    9. Chronic pain of left knee  -     Ambulatory Referral to Physical Therapy Evaluate and treat           --  --  VISIT 4/21:  ANNUAL MEDICARE WELLNESS EXAM 4/21 =reviewed all forms with pt in office; no new discoveries; Joseph  neg.  --  CAD/CABG 2V '04...s/p cath 4/12...per Dr Sherman; ECHO with EF 60%...10/19 has no ischemia with treadmill up to 2 miles qd or gets outside; cards is only going to see PRN as of 4/19 OV=stress ONLY if sxs---> no sxs 10/20 and saw Dr Lopez recently=stable carotids/neg holter/ECHO stable.    PVD with R carotid lesion 60-79% '09...stable 9/14... 80-90% and has f/u with Dr MARANDA Peralta for this...CTA = 75% and he will follow with dopplers since she doesn't want surgery...was stable/? better 10/17 per Dr Sabino Peralta with f/u later this fall '18...she doesn't want it followed anymore because she is against any surgery unless sxs/etc; is c/w ASA=we d/w this 4/20.    LIPIDS per endo with LDL 64 (9/16)...34 is same 4/20---> 54 is goal 4/21.  --  HTN remains well controlled.  CKD3 = 50% in 10/19 is not bad... > 60%...40% is out of no where; she does not appear dry; had U/S 7/20 per Dr Peralta=neg; I rec repeat in 3 mo in [place]---> 50% as of 4/21 OV is nice to see.  --  DM per endo=Dr Peralta...7.8 (1/15)...invokana instead of victoza now (no help januvia/invokana/elevated lipase with victoza)...8.5 with change to glipizide noted...7.8 is holding and I rec Lantus for tighter control if she does not want any treatment for progressive PVD...7.3 is good trend. (TSH neg 4/17)...6.4...7.7... 7.5 and needs better control given below=d/w Endo ? Lantus 15 qd...7.5 is stuck 4/20 = defer to Endo=they/ I am happy with this ballpark---> 7.3 in 10/20.  PROTEINURIA = 40 to 170 at 4/17 OV and will try to titrate ARB from 25 to 37 and then 50 if BP allows...103 is mild bump and ? related to A1C, so will repeat in 6 mo to ensure no rapid progression...247 and will increase to 75 mg now; call if dizzy; d/w needs tighter BS control; continue 6 mo f/u...good drop and no more room with BP anyhow---> better still 10/20 = ditto 7/3 in 4/21.  --  ANEMIA = down 13.2 to 11.4 past 2 years; rec MWF iron, cologuard, and call...stuck at 11.8 with lowish  iron=d/w again to take q MWF=she only took it for a few weeks as of 4/20 OV...11.4 and I d/w again 10/20 that she should stay on the iron and take B12 for level 240=11.3 with same recs 4/21.  GERD at 4/18 OV and I rec pepcid q PM.  --  R LEG=11/19 twisted it, 12/19 = pain, 1/20 = pain but refused meds, 1/30/20 fell on face, 2/20 = knee xray neg, LS spine=spurs L1-4 and went to PT and they manipulated her hip and made it worse; I want her on gabapentin for the spurs=pain down leg into knee, but she is focused on the knee, so will get MRI for her and take it from there---> had tear meniscus, did not like Dr Epps, she refused therapy, went to PT a month; no daily meds, occ tylenol.  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  --  OSTEOPENIA...BMD 7/12...spine -0.8, hip -1.4...12/17 = spine -1.3, hip -1.6; d/w po Ca+ = tums/etc---> BMD 10/20 = spine -0.4, hip - 1.5 = no tx needed.  VIT D DEF and needs tx now=50K to start...38 is stable---> 31.  --  --  BREAST CA s/p L lump '87.  MMG 7/19/23 at [facility] per me.   COLON 1/23 = 4 polyps = no more per Dr Andrews.  Pneumovax x 2; Zostavax '13; Prevnar '16; Shingrix x 2.  (, my pt Bruce, 3 kids with Dr Turcios=daughter; has place in [place] now and goes for 3 months q winter---> to go for 6 months '19). --  --    Follow Up   Return in about 6 months (around 10/24/2024).  Patient was given instructions and counseling regarding her condition or for health maintenance advice. Please see specific information pulled into the AVS if appropriate.

## 2024-04-24 NOTE — ASSESSMENT & PLAN NOTE
B12 is stable at 675 as of her 4/24 OV, she can continue with just low-dose over-the-counter supplementation.

## 2024-04-24 NOTE — ASSESSMENT & PLAN NOTE
Blood pressure remains well controlled as of her 4/24 office visit. This was despite discontinuing the losartan secondary to progressive CKD/hyperkalemia. Patient's pulse is in the 80s, so she stable to continue with just low-dose metoprolol

## 2024-07-25 RX ORDER — EZETIMIBE 10 MG/1
10 TABLET ORAL DAILY
Qty: 90 TABLET | Refills: 1 | Status: SHIPPED | OUTPATIENT
Start: 2024-07-25

## 2024-07-25 RX ORDER — METOPROLOL SUCCINATE 50 MG/1
TABLET, EXTENDED RELEASE ORAL
Qty: 135 TABLET | Refills: 1 | Status: SHIPPED | OUTPATIENT
Start: 2024-07-25

## 2024-10-01 ENCOUNTER — LAB (OUTPATIENT)
Dept: LAB | Facility: HOSPITAL | Age: 84
End: 2024-10-01
Payer: MEDICARE

## 2024-10-01 ENCOUNTER — TRANSCRIBE ORDERS (OUTPATIENT)
Dept: LAB | Facility: HOSPITAL | Age: 84
End: 2024-10-01
Payer: MEDICARE

## 2024-10-01 DIAGNOSIS — Z00.00 MEDICARE ANNUAL WELLNESS VISIT, SUBSEQUENT: ICD-10-CM

## 2024-10-01 DIAGNOSIS — E11.21 TYPE II DIABETES MELLITUS WITH NEPHROPATHY: Primary | ICD-10-CM

## 2024-10-01 DIAGNOSIS — Z79.4 TYPE 2 DIABETES MELLITUS WITHOUT COMPLICATION, WITH LONG-TERM CURRENT USE OF INSULIN: ICD-10-CM

## 2024-10-01 DIAGNOSIS — E11.21 TYPE II DIABETES MELLITUS WITH NEPHROPATHY: ICD-10-CM

## 2024-10-01 DIAGNOSIS — N18.32 STAGE 3B CHRONIC KIDNEY DISEASE: ICD-10-CM

## 2024-10-01 DIAGNOSIS — E78.2 MIXED HYPERLIPIDEMIA: ICD-10-CM

## 2024-10-01 DIAGNOSIS — I10 PRIMARY HYPERTENSION: ICD-10-CM

## 2024-10-01 DIAGNOSIS — E11.9 TYPE 2 DIABETES MELLITUS WITHOUT COMPLICATION, WITH LONG-TERM CURRENT USE OF INSULIN: ICD-10-CM

## 2024-10-01 LAB
ALBUMIN SERPL-MCNC: 4.2 G/DL (ref 3.5–5.2)
ALBUMIN UR-MCNC: 6.9 MG/DL
ALBUMIN/GLOB SERPL: 1.6 G/DL
ALP SERPL-CCNC: 72 U/L (ref 39–117)
ALT SERPL W P-5'-P-CCNC: 20 U/L (ref 1–33)
ANION GAP SERPL CALCULATED.3IONS-SCNC: 8 MMOL/L (ref 5–15)
AST SERPL-CCNC: 20 U/L (ref 1–32)
BASOPHILS # BLD AUTO: 0.04 10*3/MM3 (ref 0–0.2)
BASOPHILS NFR BLD AUTO: 0.7 % (ref 0–1.5)
BILIRUB SERPL-MCNC: 0.5 MG/DL (ref 0–1.2)
BUN SERPL-MCNC: 17 MG/DL (ref 8–23)
BUN/CREAT SERPL: 10.3 (ref 7–25)
CALCIUM SPEC-SCNC: 9.8 MG/DL (ref 8.6–10.5)
CHLORIDE SERPL-SCNC: 103 MMOL/L (ref 98–107)
CHOLEST SERPL-MCNC: 163 MG/DL (ref 0–200)
CO2 SERPL-SCNC: 25 MMOL/L (ref 22–29)
CREAT SERPL-MCNC: 1.65 MG/DL (ref 0.57–1)
CREAT UR-MCNC: 98.9 MG/DL
DEPRECATED RDW RBC AUTO: 41.8 FL (ref 37–54)
EGFRCR SERPLBLD CKD-EPI 2021: 30.9 ML/MIN/1.73
EOSINOPHIL # BLD AUTO: 0.37 10*3/MM3 (ref 0–0.4)
EOSINOPHIL NFR BLD AUTO: 6.9 % (ref 0.3–6.2)
ERYTHROCYTE [DISTWIDTH] IN BLOOD BY AUTOMATED COUNT: 12.6 % (ref 12.3–15.4)
GLOBULIN UR ELPH-MCNC: 2.7 GM/DL
GLUCOSE SERPL-MCNC: 178 MG/DL (ref 65–99)
HBA1C MFR BLD: 8.3 % (ref 4.8–5.6)
HCT VFR BLD AUTO: 38.5 % (ref 34–46.6)
HDLC SERPL-MCNC: 37 MG/DL (ref 40–60)
HGB BLD-MCNC: 12.5 G/DL (ref 12–15.9)
IMM GRANULOCYTES # BLD AUTO: 0.02 10*3/MM3 (ref 0–0.05)
IMM GRANULOCYTES NFR BLD AUTO: 0.4 % (ref 0–0.5)
LDLC SERPL CALC-MCNC: 90 MG/DL (ref 0–100)
LDLC/HDLC SERPL: 2.27 {RATIO}
LYMPHOCYTES # BLD AUTO: 1.73 10*3/MM3 (ref 0.7–3.1)
LYMPHOCYTES NFR BLD AUTO: 32 % (ref 19.6–45.3)
MCH RBC QN AUTO: 29.3 PG (ref 26.6–33)
MCHC RBC AUTO-ENTMCNC: 32.5 G/DL (ref 31.5–35.7)
MCV RBC AUTO: 90.2 FL (ref 79–97)
MICROALBUMIN/CREAT UR: 69.8 MG/G (ref 0–29)
MONOCYTES # BLD AUTO: 0.48 10*3/MM3 (ref 0.1–0.9)
MONOCYTES NFR BLD AUTO: 8.9 % (ref 5–12)
NEUTROPHILS NFR BLD AUTO: 2.76 10*3/MM3 (ref 1.7–7)
NEUTROPHILS NFR BLD AUTO: 51.1 % (ref 42.7–76)
NRBC BLD AUTO-RTO: 0 /100 WBC (ref 0–0.2)
PLATELET # BLD AUTO: 243 10*3/MM3 (ref 140–450)
PMV BLD AUTO: 11.1 FL (ref 6–12)
POTASSIUM SERPL-SCNC: 5.2 MMOL/L (ref 3.5–5.2)
PROT SERPL-MCNC: 6.9 G/DL (ref 6–8.5)
RBC # BLD AUTO: 4.27 10*6/MM3 (ref 3.77–5.28)
SODIUM SERPL-SCNC: 136 MMOL/L (ref 136–145)
TRIGL SERPL-MCNC: 210 MG/DL (ref 0–150)
TSH SERPL DL<=0.05 MIU/L-ACNC: 3.29 UIU/ML (ref 0.27–4.2)
VIT B12 BLD-MCNC: 327 PG/ML (ref 211–946)
VLDLC SERPL-MCNC: 36 MG/DL (ref 5–40)
WBC NRBC COR # BLD AUTO: 5.4 10*3/MM3 (ref 3.4–10.8)

## 2024-10-01 PROCEDURE — 85025 COMPLETE CBC W/AUTO DIFF WBC: CPT

## 2024-10-01 PROCEDURE — 84443 ASSAY THYROID STIM HORMONE: CPT

## 2024-10-01 PROCEDURE — 83036 HEMOGLOBIN GLYCOSYLATED A1C: CPT

## 2024-10-01 PROCEDURE — 80053 COMPREHEN METABOLIC PANEL: CPT

## 2024-10-01 PROCEDURE — 82043 UR ALBUMIN QUANTITATIVE: CPT

## 2024-10-01 PROCEDURE — 82607 VITAMIN B-12: CPT

## 2024-10-01 PROCEDURE — 80061 LIPID PANEL: CPT

## 2024-10-01 PROCEDURE — 36415 COLL VENOUS BLD VENIPUNCTURE: CPT

## 2024-10-01 PROCEDURE — 82570 ASSAY OF URINE CREATININE: CPT

## 2024-10-04 ENCOUNTER — OFFICE VISIT (OUTPATIENT)
Dept: CARDIOLOGY | Facility: CLINIC | Age: 84
End: 2024-10-04
Payer: MEDICARE

## 2024-10-04 VITALS
HEIGHT: 62 IN | DIASTOLIC BLOOD PRESSURE: 67 MMHG | HEART RATE: 71 BPM | BODY MASS INDEX: 23.92 KG/M2 | SYSTOLIC BLOOD PRESSURE: 123 MMHG | WEIGHT: 130 LBS

## 2024-10-04 DIAGNOSIS — I25.10 CORONARY ARTERY DISEASE INVOLVING NATIVE CORONARY ARTERY OF NATIVE HEART WITHOUT ANGINA PECTORIS: Primary | ICD-10-CM

## 2024-10-04 DIAGNOSIS — I65.23 BILATERAL CAROTID ARTERY STENOSIS: ICD-10-CM

## 2024-10-04 DIAGNOSIS — E78.2 HYPERLIPEMIA, MIXED: ICD-10-CM

## 2024-10-04 DIAGNOSIS — I10 HYPERTENSION, ESSENTIAL: ICD-10-CM

## 2024-10-04 PROCEDURE — 1159F MED LIST DOCD IN RCRD: CPT | Performed by: INTERNAL MEDICINE

## 2024-10-04 PROCEDURE — 3074F SYST BP LT 130 MM HG: CPT | Performed by: INTERNAL MEDICINE

## 2024-10-04 PROCEDURE — 3078F DIAST BP <80 MM HG: CPT | Performed by: INTERNAL MEDICINE

## 2024-10-04 PROCEDURE — 1160F RVW MEDS BY RX/DR IN RCRD: CPT | Performed by: INTERNAL MEDICINE

## 2024-10-04 PROCEDURE — 99214 OFFICE O/P EST MOD 30 MIN: CPT | Performed by: INTERNAL MEDICINE

## 2024-10-04 NOTE — PROGRESS NOTES
Chief Complaint  1 yrs follow up , Coronary Artery Disease, Hypertension, Hyperlipidemia, and Bilateral carotid artery stenosis    Subjective            Lucía Ramón Thomas presents to [facility] C[facility]OLOG[facility]      Ms. Thomas is here for follow-up she has known coronary artery disease with CABG in 2004.  She has a chronically occluded circumflex with collaterals noted on previous catheterization in 2012.  She has chronic kidney disease, hypertension, mixed hyperlipidemia.  Overall she is doing well.  She denies chest pain, palpitations or excessive shortness of breath.    PMH  Past Medical History:   Diagnosis Date    Atherosclerosis of coronary artery bypass graft without angina pectoris     Breast cancer     CAD (coronary artery disease)     Chronic fatigue, unspecified     Colon polyps     Disorder of bone, unspecified     DM (diabetes mellitus)     Essential (primary) hypertension     Iron deficiency anemia, unspecified     Mixed hyperlipidemia     Peripheral vascular disease, unspecified     Pure hypercholesterolemia     PVD (peripheral vascular disease)     Type 2 diabetes mellitus without complication     Vitamin D deficiency, unspecified          SURGICALHX  Past Surgical History:   Procedure Laterality Date    BREAST LUMPECTOMY Left     COLONOSCOPY  2011    Dr. guidry in [place]    COLONOSCOPY N/A 1/18/2023    Procedure: COLONOSCOPY with cold snare polypectomy;  Surgeon: Mark Andrews MD;  Location: [facility] ENDOSCOPY;  Service: Gastroenterology;  Laterality: N/A;  colon polyps    CORONARY ARTERY BYPASS GRAFT      ECTOPIC PREGNANCY Right         SOC  Social History     Socioeconomic History    Marital status:    Tobacco Use    Smoking status: Never    Smokeless tobacco: Never   Vaping Use    Vaping status: Never Used   Substance and Sexual Activity    Alcohol use: Never    Drug use: Never    Sexual activity: Defer         FAMHX  Family History   Problem Relation Age of Onset  "   Colon cancer Neg Hx     Malig Hyperthermia Neg Hx           ALLERGY  Allergies   Allergen Reactions    Farxiga [Dapagliflozin] Other (See Comments)     UTI/yeast infections        MEDSCURRENT    Current Outpatient Medications:     Accu-Chek SmartView test strip, 2 (Two) Times a Day. test blood sugar, Disp: , Rfl:     Aspirin Low Dose 81 MG EC tablet, Take 1 tablet by mouth Every Evening., Disp: , Rfl:     ezetimibe (ZETIA) 10 MG tablet, TAKE ONE TABLET BY MOUTH EVERY DAY, Disp: 90 tablet, Rfl: 1    glipizide (GLUCOTROL) 10 MG tablet, Take 1 tablet by mouth 2 (Two) Times a Day Before Meals. PT takes 5mg BID, Disp: , Rfl:     losartan (COZAAR) 25 MG tablet, Take 1 tablet by mouth Daily., Disp: , Rfl:     metoprolol succinate XL (TOPROL-XL) 50 MG 24 hr tablet, TAKE ONE AND ONE-HALF tablets BY MOUTH EVERY DAY, Disp: 135 tablet, Rfl: 1    rosuvastatin (CRESTOR) 20 MG tablet, Take 1 tablet by mouth Daily., Disp: , Rfl:     Trulicity 3 MG/0.5ML solution pen-injector, Inject 3 mg under the skin every 7 (seven) days., Disp: , Rfl:     vitamin D (ERGOCALCIFEROL) 1.25 MG (32975 UT) capsule capsule, Take 1 capsule by mouth 1 (One) Time Per Week., Disp: 12 capsule, Rfl: 1      Review of Systems   Constitutional: Negative.   HENT: Negative.     Eyes: Negative.    Cardiovascular:  Negative for chest pain and dyspnea on exertion.   Respiratory: Negative.  Negative for shortness of breath.    Endocrine: Negative.    Hematologic/Lymphatic: Negative.    Skin: Negative.    Musculoskeletal: Negative.    Gastrointestinal: Negative.    Genitourinary: Negative.    Neurological: Negative.    Psychiatric/Behavioral: Negative.          Objective     /67   Pulse 71   Ht 157.5 cm (62.01\")   Wt 59 kg (130 lb)   BMI 23.77 kg/m²       General Appearance:   well developed  well nourished  HENT:   oropharynx moist  lips not cyanotic  Neck:  thyroid not enlarged  supple  Respiratory:  no respiratory distress  normal breath sounds  no " rales  Cardiovascular:  no jugular venous distention  regular rhythm  apical impulse normal  S1 normal, S2 normal  no S3, no S4   Very soft ejection systolic murmur  no rub, no thrill  carotid pulses normal; no bruit  pedal pulses normal  lower extremity edema: none    Musculoskeletal:  no clubbing of fingers.   normocephalic, head atraumatic  Skin:   warm, dry  Psychiatric:  judgement and insight appropriate  normal mood and affect      Result Review :     The following data was reviewed by: Stiven Gibson MD on 10/04/2023:    CMP          10/13/2023    03:23 4/17/2024    08:20 10/1/2024    07:40   CMP   Glucose 182  147  178    BUN 23  13  17    Creatinine 1.49  1.50  1.65    EGFR 35.1  34.6  30.9    Sodium 139  139  136    Potassium 4.5  4.5  5.2    Chloride 107  103  103    Calcium 9.1  9.2  9.8    Total Protein 7.2  6.9  6.9    Albumin 4.0  4.1  4.2    Globulin 3.2  2.8  2.7    Total Bilirubin 0.3  0.5  0.5    Alkaline Phosphatase 75  66  72    AST (SGOT) 21  19  20    ALT (SGPT) 15  19  20    Albumin/Globulin Ratio 1.3  1.5  1.6    BUN/Creatinine Ratio 15.4  8.7  10.3    Anion Gap 11.2  11.6  8.0      CBC          10/13/2023    03:23 4/17/2024    08:20 10/1/2024    07:40   CBC   WBC 4.62  4.67  5.40    RBC 4.40  4.11  4.27    Hemoglobin 12.5  12.0  12.5    Hematocrit 40.4  37.0  38.5    MCV 91.8  90.0  90.2    MCH 28.4  29.2  29.3    MCHC 30.9  32.4  32.5    RDW 12.8  12.7  12.6    Platelets 232  218  243      Lipid Panel          4/17/2024    08:20 10/1/2024    07:40   Lipid Panel   Total Cholesterol 141  163    Triglycerides 179  210    HDL Cholesterol 40  37    VLDL Cholesterol 30  36    LDL Cholesterol  71  90    LDL/HDL Ratio 1.63  2.27      TSH          4/17/2024    08:20 10/1/2024    07:40   TSH   TSH 2.970  3.290        Data reviewed : Previous cardiology records reviewed, primary care records reviewed      Procedures              Assessment and Plan        ASSESSMENT:  Encounter Diagnoses    Name Primary?    Coronary artery disease involving native coronary artery of native heart without angina pectoris Yes    Hypertension, essential     Hyperlipemia, mixed     Bilateral carotid artery stenosis          PLAN:    1.  Coronary artery disease, native vessel without angina.  She had CABG in 2004.  She is clinically stable.  Continue current medical therapy  2.  Essential hypertension-controlled, continue current medical therapy  3.  Mixed hyperlipidemia-LDL has crept up a bit.  With higher doses of statin apparently she had blood sugar issue so we will continue the current combination  4.  Bilateral carotid artery stenosis-the patient clearly states she does not want to have anything done about that so there is no reason at this point to repeat duplex ultrasound.  Continue aspirin and statin      Annual follow-up will be arranged          Patient was given instructions and counseling regarding her condition or for health maintenance advice. Please see specific information pulled into the AVS if appropriate.             JEAN CARLOS Gibson MD  10/4/2024    09:30 EDT

## 2024-10-10 ENCOUNTER — OFFICE VISIT (OUTPATIENT)
Dept: INTERNAL MEDICINE | Age: 84
End: 2024-10-10
Payer: MEDICARE

## 2024-10-10 VITALS
HEART RATE: 75 BPM | WEIGHT: 131 LBS | SYSTOLIC BLOOD PRESSURE: 100 MMHG | DIASTOLIC BLOOD PRESSURE: 56 MMHG | HEIGHT: 62 IN | OXYGEN SATURATION: 100 % | TEMPERATURE: 97.8 F | BODY MASS INDEX: 24.11 KG/M2

## 2024-10-10 DIAGNOSIS — E11.9 TYPE 2 DIABETES MELLITUS WITHOUT COMPLICATION, WITH LONG-TERM CURRENT USE OF INSULIN: Primary | ICD-10-CM

## 2024-10-10 DIAGNOSIS — Z00.00 WELL ADULT EXAM: ICD-10-CM

## 2024-10-10 DIAGNOSIS — Z79.4 TYPE 2 DIABETES MELLITUS WITHOUT COMPLICATION, WITH LONG-TERM CURRENT USE OF INSULIN: Primary | ICD-10-CM

## 2024-10-10 DIAGNOSIS — I25.9 IHD (ISCHEMIC HEART DISEASE): ICD-10-CM

## 2024-10-10 DIAGNOSIS — E78.2 MIXED HYPERLIPIDEMIA: ICD-10-CM

## 2024-10-10 DIAGNOSIS — I10 PRIMARY HYPERTENSION: ICD-10-CM

## 2024-10-10 DIAGNOSIS — N18.32 STAGE 3B CHRONIC KIDNEY DISEASE: ICD-10-CM

## 2024-10-10 PROCEDURE — 3078F DIAST BP <80 MM HG: CPT | Performed by: INTERNAL MEDICINE

## 2024-10-10 PROCEDURE — 99214 OFFICE O/P EST MOD 30 MIN: CPT | Performed by: INTERNAL MEDICINE

## 2024-10-10 PROCEDURE — 1159F MED LIST DOCD IN RCRD: CPT | Performed by: INTERNAL MEDICINE

## 2024-10-10 PROCEDURE — 1160F RVW MEDS BY RX/DR IN RCRD: CPT | Performed by: INTERNAL MEDICINE

## 2024-10-10 PROCEDURE — G2211 COMPLEX E/M VISIT ADD ON: HCPCS | Performed by: INTERNAL MEDICINE

## 2024-10-10 PROCEDURE — 3074F SYST BP LT 130 MM HG: CPT | Performed by: INTERNAL MEDICINE

## 2024-10-10 PROCEDURE — 1126F AMNT PAIN NOTED NONE PRSNT: CPT | Performed by: INTERNAL MEDICINE

## 2024-10-10 NOTE — ASSESSMENT & PLAN NOTE
Patient with no ischemic symptoms as of her 10/24 OV.  She is followed closely by Dr. Gibson, saw him recently.  She is maintained on aspirin and beta-blocker for antianginal benefit, addressing risk factors as well.

## 2024-10-10 NOTE — ASSESSMENT & PLAN NOTE
GFR is stable at 31 as of 10/24 OV.  Potassium upper end of normal at 5.2.  She appears to be tolerating resumption of low-dose losartan.  Do recommend 3-month lab given trend though, further recommendations at that time.

## 2024-10-10 NOTE — ASSESSMENT & PLAN NOTE
A1c is 8.3 as of her 10/24 OV.  She just saw Dr. Peralta yesterday, had to switch from Trulicity to Mounjaro secondary to availability.  She is going to gradually titrate that dose over the winter.  Fasting sugar previously in the 140 ballpark, presently 180.    Additionally patient is maintained on full dose glipizide, certainly appropriate to continue same.

## 2024-10-10 NOTE — ASSESSMENT & PLAN NOTE
LDL is up from 70 to 90 as of her 10/24 OV, this is despite moderate dose Crestor and Zetia.  She has seen cardiology, they are comfortable waiting until next year, hoping that improvement in her diabetes will help the lipid control as well.

## 2024-10-10 NOTE — PROGRESS NOTES
"Chief Complaint  Follow-up ((6 month)/Labs completed 10/01/24/Mammogram completed 08/21/24/Flu Vaccine received on Thursday 10/03/24 through [employer]//No questions / concerns voiced)    Subjective          Lucía Thomas presents to [facility]     Hyperlipidemia  Pertinent negatives include no chest pain or shortness of breath.   Follow-upPertinent negatives include no chest pain, no confusion, no palpitations, no shortness of breath, no fatigue, no blurred vision, no wheezing, no abdominal pain, no cough and no depressed mood.     History of present illness:  Patient very pleasant 83-year-old female with underlying hypertension, hyperlipidemia, resultant coronary artery disease prior CABG x2 with no MI, as well as underlying diabetes, who is coming in 10/24 for routine 6-month follow-up.  We will review her labs and make further recommendations at that time.    Review of Systems   Constitutional:  Negative for appetite change, fatigue and fever.   HENT:  Negative for congestion and ear pain.    Eyes:  Negative for blurred vision.   Respiratory:  Negative for cough, chest tightness, shortness of breath and wheezing.    Cardiovascular:  Negative for chest pain, palpitations and leg swelling.   Gastrointestinal:  Negative for abdominal pain.   Genitourinary:  Negative for difficulty urinating, dysuria and hematuria.   Musculoskeletal:  Negative for arthralgias and gait problem.   Skin:  Negative for skin lesions.   Neurological:  Negative for syncope, memory problem and confusion.   Psychiatric/Behavioral:  Negative for self-injury and depressed mood.        Objective   Vital Signs:   /56   Pulse 75   Temp 97.8 °F (36.6 °C) (Temporal)   Ht 157.5 cm (62.01\")   Wt 59.4 kg (131 lb)   SpO2 100%   BMI 23.95 kg/m²           Physical Exam  Vitals and nursing note reviewed.   Constitutional:       General: She is not in acute distress.     Appearance: Normal appearance. She " is not toxic-appearing.   HENT:      Head: Atraumatic.      Right Ear: External ear normal.      Left Ear: External ear normal.      Nose: Nose normal.      Mouth/Throat:      Mouth: Mucous membranes are moist.   Eyes:      General:         Right eye: No discharge.         Left eye: No discharge.      Extraocular Movements: Extraocular movements intact.      Pupils: Pupils are equal, round, and reactive to light.   Cardiovascular:      Rate and Rhythm: Normal rate and regular rhythm.      Pulses: Normal pulses.      Heart sounds: Normal heart sounds. No murmur heard.     No gallop.   Pulmonary:      Effort: Pulmonary effort is normal. No respiratory distress.      Breath sounds: No wheezing, rhonchi or rales.   Abdominal:      General: There is no distension.      Palpations: Abdomen is soft. There is no mass.      Tenderness: There is no abdominal tenderness. There is no guarding.   Musculoskeletal:         General: No swelling or tenderness.      Cervical back: No tenderness.      Right lower leg: No edema.      Left lower leg: No edema.   Skin:     General: Skin is warm and dry.      Findings: No rash.   Neurological:      General: No focal deficit present.      Mental Status: She is alert and oriented to person, place, and time. Mental status is at baseline.      Motor: No weakness.      Gait: Gait normal.   Psychiatric:         Mood and Affect: Mood normal.         Thought Content: Thought content normal.          Result Review :   The following data was reviewed by: Juan Alberto Genao MD on 10/19/2021:                  Assessment and Plan    Diagnoses and all orders for this visit:    1. Type 2 diabetes mellitus without complication, with long-term current use of insulin (Primary)  Overview:  Metformin = CKD3b  Farxiga = UTI/yeast infection    Assessment & Plan:  A1c is 8.3 as of her 10/24 OV.  She just saw Dr. Peralta yesterday, had to switch from Trulicity to Mounjaro secondary to availability.  She is going to  gradually titrate that dose over the winter.  Fasting sugar previously in the 140 ballpark, presently 180.    Additionally patient is maintained on full dose glipizide, certainly appropriate to continue same.    Orders:  -     Hemoglobin A1c; Future  -     Hemoglobin A1c; Future    2. Stage 3b chronic kidney disease  Assessment & Plan:  GFR is stable at 31 as of 10/24 OV.  Potassium upper end of normal at 5.2.  She appears to be tolerating resumption of low-dose losartan.  Do recommend 3-month lab given trend though, further recommendations at that time.    Orders:  -     Renal Function Panel; Future  -     CBC & Differential; Future  -     Urinalysis With Culture If Indicated -; Future    3. Primary hypertension  Assessment & Plan:  Patient's blood pressure with excellent control and her pulse is in the mid 70s as of her 10/24 OV.  She is maintained on low-dose metoprolol and stable to continue same.  She was on moderate dose losartan, it was discontinued due to progressive CKD.  Nephrology recently started low-dose ARB back on board, and GFR is holding at 31.  Patient stable continue same meds.    Orders:  -     Comprehensive Metabolic Panel; Future    4. Mixed hyperlipidemia  Assessment & Plan:  LDL is up from 70 to 90 as of her 10/24 OV, this is despite moderate dose Crestor and Zetia.  She has seen cardiology, they are comfortable waiting until next year, hoping that improvement in her diabetes will help the lipid control as well.    Orders:  -     Lipid Panel; Future    5. IHD (ischemic heart disease)  Overview:  CAD/CABG 2V '04...s/p cath 4/12...per Dr Sherman.    She is followed by Dr Lopez every year or so--->Dr Gibson now.    Assessment & Plan:  Patient with no ischemic symptoms as of her 10/24 OV.  She is followed closely by Dr. Gibson, saw him recently.  She is maintained on aspirin and beta-blocker for antianginal benefit, addressing risk factors as well.      6. Well adult exam  -     TSH;  Future        --  --  VISIT 4/21:  ANNUAL MEDICARE WELLNESS EXAM 4/21 =reviewed all forms with pt in office; no new discoveries; Joseph neg.  --  CAD/CABG 2V '04...s/p cath 4/12...per Dr Sherman; ECHO with EF 60%...10/19 has no ischemia with treadmill up to 2 miles qd or gets outside; cards is only going to see PRN as of 4/19 OV=stress ONLY if sxs---> no sxs 10/20 and saw Dr Lopez recently=stable carotids/neg holter/ECHO stable.    PVD with R carotid lesion 60-79% '09...stable 9/14... 80-90% and has f/u with Dr MARANDA Peralta for this...CTA = 75% and he will follow with dopplers since she doesn't want surgery...was stable/? better 10/17 per Dr Sabino Peralta with f/u later this fall '18...she doesn't want it followed anymore because she is against any surgery unless sxs/etc; is c/w ASA=we d/w this 4/20.    LIPIDS per endo with LDL 64 (9/16)...34 is same 4/20---> 54 is goal 4/21.  --  HTN remains well controlled.  CKD3 = 50% in 10/19 is not bad... > 60%...40% is out of no where; she does not appear dry; had U/S 7/20 per Dr Peralta=neg; I rec repeat in 3 mo in [place]---> 50% as of 4/21 OV is nice to see.  --  DM per endo=Dr Peralta...7.8 (1/15)...invokana instead of victoza now (no help januvia/invokana/elevated lipase with victoza)...8.5 with change to glipizide noted...7.8 is holding and I rec Lantus for tighter control if she does not want any treatment for progressive PVD...7.3 is good trend. (TSH neg 4/17)...6.4...7.7... 7.5 and needs better control given below=d/w Endo ? Lantus 15 qd...7.5 is stuck 4/20 = defer to Endo=they/ I am happy with this ballpark---> 7.3 in 10/20.  PROTEINURIA = 40 to 170 at 4/17 OV and will try to titrate ARB from 25 to 37 and then 50 if BP allows...103 is mild bump and ? related to A1C, so will repeat in 6 mo to ensure no rapid progression...247 and will increase to 75 mg now; call if dizzy; d/w needs tighter BS control; continue 6 mo f/u...good drop and no more room with BP anyhow---> better still  10/20 = ditto 7/3 in 4/21.  --  ANEMIA = down 13.2 to 11.4 past 2 years; rec MWF iron, cologuard, and call...stuck at 11.8 with lowish iron=d/w again to take q MWF=she only took it for a few weeks as of 4/20 OV...11.4 and I d/w again 10/20 that she should stay on the iron and take B12 for level 240=11.3 with same recs 4/21.  GERD at 4/18 OV and I rec pepcid q PM.  --  R LEG=11/19 twisted it, 12/19 = pain, 1/20 = pain but refused meds, 1/30/20 fell on face, 2/20 = knee xray neg, LS spine=spurs L1-4 and went to PT and they manipulated her hip and made it worse; I want her on gabapentin for the spurs=pain down leg into knee, but she is focused on the knee, so will get MRI for her and take it from there---> had tear meniscus, did not like Dr Epps, she refused therapy, went to PT a month; no daily meds, occ tylenol.  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  --  OSTEOPENIA...BMD 7/12...spine -0.8, hip -1.4...12/17 = spine -1.3, hip -1.6; d/w po Ca+ = tums/etc---> BMD 10/20 = spine -0.4, hip - 1.5 = no tx needed.  VIT D DEF and needs tx now=50K to start...38 is stable---> 31.  --  --  BREAST CA s/p L lump '87.  MMG 8/21/24 at [facility] per me.   COLON 1/23 = 4 polyps = no more per Dr Andrews.  Pneumovax x 2; Zostavax '13; Prevnar '16; Shingrix x 2.  (, my pt Bruce, 3 kids with Dr Turcios=daughter; has place in [place] now and goes for 3 months q winter---> to go for 6 months '19). --  --    Follow Up   Return in about 6 months (around 4/10/2025).  Patient was given instructions and counseling regarding her condition or for health maintenance advice. Please see specific information pulled into the AVS if appropriate.

## 2024-10-10 NOTE — ASSESSMENT & PLAN NOTE
Patient's blood pressure with excellent control and her pulse is in the mid 70s as of her 10/24 OV.  She is maintained on low-dose metoprolol and stable to continue same.  She was on moderate dose losartan, it was discontinued due to progressive CKD.  Nephrology recently started low-dose ARB back on board, and GFR is holding at 31.  Patient stable continue same meds.

## 2025-02-03 NOTE — TELEPHONE ENCOUNTER
Caller: BOBBI SIMMONS    Relationship: Emergency Contact    Best call back number: [phone]     Requested Prescriptions:   Requested Prescriptions     Pending Prescriptions Disp Refills    metoprolol succinate XL (TOPROL-XL) 50 MG 24 hr tablet 135 tablet 1     Sig: Take 1.5 tablets by mouth Daily.    ezetimibe (ZETIA) 10 MG tablet 90 tablet 1     Sig: Take 1 tablet by mouth Daily.        Pharmacy where request should be sent: [id number] [place], [place] - [address] AT [facility] [address] - [phone]  - [phone] FX     Last office visit with prescribing clinician: 10/10/2024   Last telemedicine visit with prescribing clinician: Visit date not found   Next office visit with prescribing clinician: 4/18/2025     Additional details provided by patient: PATIENT IS OUT OF REFILLS ON EITHER MEDICATION.    Does the patient have less than a 3 day supply:  [] Yes  [] No    Would you like a call back once the refill request has been completed: [] Yes [] No    If the office needs to give you a call back, can they leave a voicemail: [] Yes [] No    [employer]ner Thorne Rep   02/03/25 16:10 EST

## 2025-02-04 RX ORDER — EZETIMIBE 10 MG/1
10 TABLET ORAL DAILY
Qty: 90 TABLET | Refills: 1 | Status: SHIPPED | OUTPATIENT
Start: 2025-02-04

## 2025-02-04 RX ORDER — METOPROLOL SUCCINATE 50 MG/1
75 TABLET, EXTENDED RELEASE ORAL DAILY
Qty: 135 TABLET | Refills: 1 | Status: SHIPPED | OUTPATIENT
Start: 2025-02-04

## 2025-04-13 ENCOUNTER — HOSPITAL ENCOUNTER (EMERGENCY)
Facility: HOSPITAL | Age: 85
Discharge: HOME | End: 2025-04-13
Attending: EMERGENCY MEDICINE | Admitting: EMERGENCY MEDICINE
Payer: MEDICARE

## 2025-04-13 ENCOUNTER — APPOINTMENT (OUTPATIENT)
Dept: GENERAL RADIOLOGY | Facility: HOSPITAL | Age: 85
End: 2025-04-13
Payer: MEDICARE

## 2025-04-13 ENCOUNTER — APPOINTMENT (OUTPATIENT)
Dept: CT IMAGING | Facility: HOSPITAL | Age: 85
End: 2025-04-13
Payer: MEDICARE

## 2025-04-13 VITALS
HEIGHT: 62 IN | HEART RATE: 70 BPM | TEMPERATURE: 97.7 F | OXYGEN SATURATION: 98 % | WEIGHT: 132.28 LBS | DIASTOLIC BLOOD PRESSURE: 64 MMHG | SYSTOLIC BLOOD PRESSURE: 135 MMHG | BODY MASS INDEX: 24.34 KG/M2 | RESPIRATION RATE: 17 BRPM

## 2025-04-13 DIAGNOSIS — M17.11 OSTEOARTHRITIS OF RIGHT KNEE, UNSPECIFIED OSTEOARTHRITIS TYPE: ICD-10-CM

## 2025-04-13 DIAGNOSIS — S00.12XA EYEBROW CONTUSION, LEFT, INITIAL ENCOUNTER: ICD-10-CM

## 2025-04-13 DIAGNOSIS — M19.041 OSTEOARTHRITIS OF RIGHT HAND, UNSPECIFIED OSTEOARTHRITIS TYPE: Primary | ICD-10-CM

## 2025-04-13 LAB
BACTERIA UR QL AUTO: ABNORMAL /HPF
BILIRUB UR QL STRIP: NEGATIVE
CLARITY UR: CLEAR
COLOR UR: YELLOW
GLUCOSE UR STRIP-MCNC: ABNORMAL MG/DL
HGB UR QL STRIP.AUTO: ABNORMAL
HOLD SPECIMEN: NORMAL
HOLD SPECIMEN: NORMAL
HYALINE CASTS UR QL AUTO: ABNORMAL /LPF
KETONES UR QL STRIP: NEGATIVE
LEUKOCYTE ESTERASE UR QL STRIP.AUTO: ABNORMAL
NITRITE UR QL STRIP: NEGATIVE
PH UR STRIP.AUTO: <=5 [PH] (ref 5–8)
PROT UR QL STRIP: ABNORMAL
RBC # UR STRIP: ABNORMAL /HPF
REF LAB TEST METHOD: ABNORMAL
SP GR UR STRIP: 1.01 (ref 1–1.03)
SQUAMOUS #/AREA URNS HPF: ABNORMAL /HPF
UROBILINOGEN UR QL STRIP: ABNORMAL
WBC # UR STRIP: ABNORMAL /HPF
WHOLE BLOOD HOLD COAG: NORMAL
WHOLE BLOOD HOLD SPECIMEN: NORMAL

## 2025-04-13 PROCEDURE — 70450 CT HEAD/BRAIN W/O DYE: CPT

## 2025-04-13 PROCEDURE — 81001 URINALYSIS AUTO W/SCOPE: CPT | Performed by: EMERGENCY MEDICINE

## 2025-04-13 PROCEDURE — 73130 X-RAY EXAM OF HAND: CPT

## 2025-04-13 PROCEDURE — 73090 X-RAY EXAM OF FOREARM: CPT

## 2025-04-13 PROCEDURE — 99284 EMERGENCY DEPT VISIT MOD MDM: CPT

## 2025-04-13 PROCEDURE — 73562 X-RAY EXAM OF KNEE 3: CPT

## 2025-04-13 RX ORDER — SODIUM CHLORIDE 0.9 % (FLUSH) 0.9 %
10 SYRINGE (ML) INJECTION AS NEEDED
Status: DISCONTINUED | OUTPATIENT
Start: 2025-04-13 | End: 2025-04-13 | Stop reason: HOSPADM

## 2025-04-13 RX ORDER — GINSENG 100 MG
1 CAPSULE ORAL ONCE
Status: COMPLETED | OUTPATIENT
Start: 2025-04-13 | End: 2025-04-13

## 2025-04-13 RX ORDER — ACETAMINOPHEN 500 MG
500 TABLET ORAL ONCE
Status: COMPLETED | OUTPATIENT
Start: 2025-04-13 | End: 2025-04-13

## 2025-04-13 RX ADMIN — BACITRACIN 0.9 G: 500 OINTMENT TOPICAL at 19:08

## 2025-04-13 RX ADMIN — ACETAMINOPHEN 500 MG: 500 TABLET ORAL at 19:08

## 2025-04-13 NOTE — ED PROVIDER NOTES
Time: 7:08 PM EDT  Date of encounter:  4/13/2025  Independent Historian/Clinical History and Information was obtained by:   Patient    History is limited by: N/A    Chief Complaint   Patient presents with    Fall    Head Injury         History of Present Illness:  Patient is a 84 y.o. year old female who presents to the emergency department for evaluation of fall.  Patient states she was leaving [facility] when she tripped over the curb and fell hitting the left side of her head along with right knee and forearms.  She takes a daily aspirin.  She denies LOC, nausea or vomiting.  Denies neck pain or other pain.  Patient denies lightheadedness, dizziness, dysuria, hematuria urinary frequency.    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 guidry in [place]    COLONOSCOPY N/A 1/18/2023    Procedure: COLONOSCOPY with cold snare polypectomy;  Surgeon: Mark Andrews MD;  Location: [facility] ENDOSCOPY;  Service: Gastroenterology;  Laterality: N/A;  colon polyps    CORONARY ARTERY BYPASS GRAFT      ECTOPIC PREGNANCY Right      Family History   Problem Relation Age of Onset    Colon  "cancer Neg Hx     Malig Hyperthermia Neg Hx        Home Medications:  Prior to Admission medications    Medication Sig Start Date End Date Taking? Authorizing Provider   Accu-Chek SmartView test strip 2 (Two) Times a Day. test blood sugar 9/20/21   Cal Wilkes MD   Aspirin Low Dose 81 MG EC tablet Take 1 tablet by mouth Every Evening. 8/1/21   Cal Wilkes MD   ezetimibe (ZETIA) 10 MG tablet Take 1 tablet by mouth Daily. 2/4/25   Juan Alberto Genao MD   glipizide (GLUCOTROL) 10 MG tablet Take 1 tablet by mouth 2 (Two) Times a Day Before Meals. PT takes 5mg BID    Cal Wilkes MD   losartan (COZAAR) 25 MG tablet Take 1 tablet by mouth Daily. 10/23/23   Cal Wilkes MD   metoprolol succinate XL (TOPROL-XL) 50 MG 24 hr tablet Take 1.5 tablets by mouth Daily. 2/4/25   Juan Alberto Genao MD   rosuvastatin (CRESTOR) 20 MG tablet Take 1 tablet by mouth Daily. 8/1/21   Cal Wilkes MD   Tirzepatide (Mounjaro) 5 MG/0.5ML solution pen-injector pen Inject 0.5 mL under the skin into the appropriate area as directed 1 (One) Time Per Week.    Cal Wilkes MD   vitamin D (ERGOCALCIFEROL) 1.25 MG (51184 UT) capsule capsule Take 1 capsule by mouth 1 (One) Time Per Week. 11/2/23   Juan Alberto Genao MD        Social History:   Social History     Tobacco Use    Smoking status: Never     Passive exposure: Never    Smokeless tobacco: Never   Vaping Use    Vaping status: Never Used   Substance Use Topics    Alcohol use: Never    Drug use: Never         Review of Systems:  Review of Systems   Gastrointestinal:  Negative for nausea and vomiting.   Skin:  Positive for color change and wound (Abrasions).   Neurological:  Positive for headaches. Negative for syncope.        Physical Exam:  /62   Pulse 77   Temp 98.7 °F (37.1 °C) (Oral)   Resp 16   Ht 157.5 cm (62\")   Wt 60 kg (132 lb 4.4 oz)   SpO2 100%   BMI 24.19 kg/m²         Physical Exam  Vitals and nursing note reviewed. "   Constitutional:       Appearance: Normal appearance.   HENT:      Head: Normocephalic. Contusion present. No abrasion, masses or laceration.        Nose: Nose normal.      Mouth/Throat:      Mouth: Mucous membranes are moist.   Eyes:      General: No scleral icterus.        Right eye: No discharge.         Left eye: No discharge.      Extraocular Movements: Extraocular movements intact.      Conjunctiva/sclera: Conjunctivae normal.      Pupils: Pupils are equal, round, and reactive to light.   Cardiovascular:      Rate and Rhythm: Normal rate and regular rhythm.      Heart sounds: Normal heart sounds.   Pulmonary:      Effort: Pulmonary effort is normal.      Breath sounds: Normal breath sounds.   Chest:      Chest wall: No tenderness.   Abdominal:      General: Abdomen is flat. Bowel sounds are normal.      Palpations: Abdomen is soft.      Tenderness: There is no abdominal tenderness. There is no guarding or rebound.   Musculoskeletal:         General: Normal range of motion.        Arms:         Hands:       Cervical back: Normal range of motion and neck supple. No signs of trauma or torticollis. No pain with movement, spinous process tenderness or muscular tenderness. Normal range of motion.        Legs:    Skin:     General: Skin is warm and dry.   Neurological:      General: No focal deficit present.      Mental Status: She is alert and oriented to person, place, and time.   Psychiatric:         Mood and Affect: Mood normal.         Behavior: Behavior normal.                          Medical Decision Making:      Comorbidities that affect care:    Coronary Artery Disease, Diabetes, Hypertension    External Notes reviewed:    Previous Clinic Note: Office visit with internal medicine October 10, 2024      The following orders were placed and all results were independently analyzed by me:  Orders Placed This Encounter   Procedures    CT Head Without Contrast    XR Hand 3+ View Right    XR Forearm 2 View Right     XR Knee 3 View Right    Morse Draw    Urinalysis With Microscopic If Indicated (No Culture) - Urine, Clean Catch    Urinalysis, Microscopic Only - Urine, Clean Catch    Insert peripheral IV    Green Top (Gel)    Lavender Top    Gold Top - SST    Light Blue Top       Medications Given in the Emergency Department:  Medications   sodium chloride 0.9 % flush 10 mL (has no administration in time range)   acetaminophen (TYLENOL) tablet 500 mg (500 mg Oral Given 4/13/25 1908)   bacitracin 500 UNIT/GM ointment 0.9 g (0.9 g Topical Given 4/13/25 1908)        ED Course:    The patient was initially evaluated in the triage area where orders were placed. The patient was later dispositioned by Paola Barillas PA-C.      The patient was advised to stay for completion of workup which includes but is not limited to communication of labs and radiological results, reassessment and plan. The patient was advised that leaving prior to disposition by a provider could result in critical findings that are not communicated to the patient.     ED Course as of 04/13/25 2053   Sun Apr 13, 2025   1949 States that her right knee feels like it is more stiff now than when she first came in.  Will order x-ray of her right knee [AJ]      ED Course User Index  [AJ] Paola Barillas PA-C       Labs:    Lab Results (last 24 hours)       Procedure Component Value Units Date/Time    Urinalysis With Microscopic If Indicated (No Culture) - Urine, Clean Catch [[id number]]  (Abnormal) Collected: 04/13/25 1835    Specimen: Urine, Clean Catch Updated: 04/13/25 1854     Color, UA Yellow     Appearance, UA Clear     pH, UA <=5.0     Specific Gravity, UA 1.006     Glucose,  mg/dL (1+)     Ketones, UA Negative     Bilirubin, UA Negative     Blood, UA Trace     Protein, UA Trace     Leuk Esterase, UA Moderate (2+)     Nitrite, UA Negative     Urobilinogen, UA 0.2 E.U./dL    Urinalysis, Microscopic Only - Urine, Clean Catch [[id number]]  (Abnormal)  Collected: 04/13/25 1835    Specimen: Urine, Clean Catch Updated: 04/13/25 1854     RBC, UA None Seen /HPF      WBC, UA 21-50 /HPF      Bacteria, UA None Seen /HPF      Squamous Epithelial Cells, UA 0-2 /HPF      Hyaline Casts, UA 3-6 /LPF      Methodology Automated Microscopy             Imaging:    XR Knee 3 View Right  Result Date: 4/13/2025  XR KNEE 3 VW RIGHT Date of Exam: 4/13/2025 8:08 PM EDT Indication: Pain after fall. Comparison: None available. Findings: Atherosclerotic calcifications are noted. There is tricompartmental osteoarthritis. No fracture or dislocation. No bone erosion or destruction. No joint effusion.     Tricompartmental osteoarthritis. No acute findings. Electronically Signed: Wisam Truong MD  4/13/2025 8:37 PM EDT  Workstation ID: [id number]    XR Forearm 2 View Right  Result Date: 4/13/2025  XR FOREARM 2 VW RIGHT Date of Exam: 4/13/2025 7:17 PM EDT Indication: Pain after fall. Comparison: None available. Findings: No fracture or dislocation. No bone erosion or destruction. No foreign body. There is degenerative disease in the wrist.     No acute fracture or malalignment. Electronically Signed: Wisam Truong MD  4/13/2025 7:38 PM EDT  Workstation ID: [id number]    XR Hand 3+ View Right  Result Date: 4/13/2025  XR HAND 3+ VW RIGHT Date of Exam: 4/13/2025 7:17 PM EDT Indication: Pain after fall. Comparison: None available. Findings: No acute fracture or dislocation. No bone erosion or destruction. There is diffuse osteoarthritis in the hand with osteoarthritis involving the first CMC joint and triscaphe joint in the wrist. No radiopaque foreign body.     1.No acute fracture or dislocation. 2.Osteoarthritis. Electronically Signed: Wisam Truong MD  4/13/2025 7:37 PM EDT  Workstation ID: [id number]    CT Head Without Contrast  Result Date: 4/13/2025  CT HEAD WO CONTRAST Date of Exam: 4/13/2025 6:37 PM EDT Indication: fall. Comparison: CT head 11/23/2018 Technique: Axial CT images were  obtained of the head without contrast administration.  Reconstructed coronal and sagittal images were also obtained. Automated exposure control and iterative construction methods were used. Findings: The ventricles, sulci, and cerebellar folia are mildly and diffusely prominent. Mild diminished density in cerebral white matter is consistent with mild gliosis and/or scattered old lacunar infarcts. There is no CT evidence of acute intracranial hemorrhage, mass, or mass effect. No abnormality of the orbits is evident. The paranasal sinuses, middle ears, and mastoid air cells are well aerated. No fractures are seen on bone window images.     Impression: CT scan of the head without IV contrast demonstrating mild age-appropriate involutional changes. No acute intracranial abnormality is seen. Electronically Signed: Les Srivastava MD  4/13/2025 6:42 PM EDT  Workstation ID: [id number]        Differential Diagnosis and Discussion:      Extremity Pain: Differential diagnosis includes but is not limited to soft tissue sprain, tendonitis, tendon injury, dislocation, fracture, deep vein thrombosis, arterial insufficiency, osteoarthritis, bursitis, and ligamentous damage.  Headache: Differential diagnosis includes but is not limited to migraine, cluster headache, hypertension, tumor, subarachnoid bleeding, pseudotumor cerebri, temporal arteritis, infections, tension headache, and TMJ syndrome.  Joint Pain: Differential diagnosis includes but is not limited to polyarticular arthritis, gout, tendinitis, hemarthrosis, septic arthritis, rheumatoid arthritis, bursitis, degenerative joint disease, joint effusion, autoimmune disorder, trauma, and occult neoplasm.    PROCEDURES:    Labs were collected in the emergency department and all labs were reviewed and interpreted by me.  X-ray were performed in the emergency department and all X-ray impressions were independently interpreted by me.  CT scan was performed in the emergency department  and the CT scan radiology impression was interpreted by me.    No orders to display        Procedures    MDM     Amount and/or Complexity of Data Reviewed  Clinical lab tests: reviewed  Tests in the radiology section of CPT®: reviewed                     Patient Care Considerations:    antibiotics however patient is asymptomatic for UTI NARCOTICS: I considered prescribing opiate pain medication as an outpatient, however all imaging negative for fractures or dislocation      Consultants/Shared Management Plan:    None    Social Determinants of Health:    Patient is independent, reliable, and has access to care.       Disposition and Care Coordination:    Discharged: The patient is suitable and stable for discharge with no need for consideration of admission.    I have explained the patient´s condition, diagnoses and treatment plan based on the information available to me at this time. I have answered questions and addressed any concerns. The patient has a good  understanding of the patient´s diagnosis, condition, and treatment plan as can be expected at this point. The vital signs have been stable. The patient´s condition is stable and appropriate for discharge from the emergency department.      The patient will pursue further outpatient evaluation with the primary care physician or other designated or consulting physician as outlined in the discharge instructions. They are agreeable to this plan of care and follow-up instructions have been explained in detail. The patient has received these instructions in written format and has expressed an understanding of the discharge instructions. The patient is aware that any significant change in condition or worsening of symptoms should prompt an immediate return to this or the closest emergency department or call to 911.    Final diagnoses:   Osteoarthritis of right hand, unspecified osteoarthritis type   Eyebrow contusion, left, initial encounter   Osteoarthritis of right  knee, unspecified osteoarthritis type        ED Disposition       ED Disposition   Discharge    Condition   Stable    Comment   --               This medical record created using voice recognition software.             Paola Barillas PA-C  04/13/25 2054

## 2025-04-14 ENCOUNTER — TELEPHONE (OUTPATIENT)
Age: 85
End: 2025-04-14

## 2025-04-14 NOTE — TELEPHONE ENCOUNTER
Caller: MYRANDA CHOUDHARY- DAUGHTER     Relationship:      Best call back number: [phone]      What is the best time to reach you: ANYTIME      Who are you requesting to speak with (clinical staff, provider,  specific staff member): CLINICAL      What was the call regarding: PATIENT DAUGHTER IS CALLING REQUESTING TO PROVIDE PCP WITH INFORMATION ON BEHALF OF THE PATIENT

## 2025-04-14 NOTE — DISCHARGE INSTRUCTIONS
CT of your head was negative for any internal bleeding or skull fractures.  X-ray of your hand shows you have osteoarthritis as well as your right knee.  No fractures or dislocations seen on x-ray.  Please follow-up with your PCP as needed.  Tylenol as needed for pain and ice to your left eyebrow for swelling and bruising

## 2025-04-15 ENCOUNTER — TRANSCRIBE ORDERS (OUTPATIENT)
Dept: LAB | Facility: HOSPITAL | Age: 85
End: 2025-04-15
Payer: MEDICARE

## 2025-04-15 ENCOUNTER — LAB (OUTPATIENT)
Dept: LAB | Facility: HOSPITAL | Age: 85
End: 2025-04-15
Payer: MEDICARE

## 2025-04-15 DIAGNOSIS — E11.21 TYPE II DIABETES MELLITUS WITH NEPHROPATHY: Primary | ICD-10-CM

## 2025-04-15 DIAGNOSIS — R80.9 PROTEINURIA, UNSPECIFIED TYPE: ICD-10-CM

## 2025-04-15 DIAGNOSIS — I12.9 HYPERTENSIVE NEPHROPATHY: ICD-10-CM

## 2025-04-15 DIAGNOSIS — E11.9 DIABETES MELLITUS WITHOUT COMPLICATION: ICD-10-CM

## 2025-04-15 DIAGNOSIS — E78.2 MIXED HYPERLIPIDEMIA: ICD-10-CM

## 2025-04-15 DIAGNOSIS — E11.21 TYPE II DIABETES MELLITUS WITH NEPHROPATHY: ICD-10-CM

## 2025-04-15 DIAGNOSIS — N18.32 CHRONIC KIDNEY DISEASE (CKD) STAGE G3B/A1, MODERATELY DECREASED GLOMERULAR FILTRATION RATE (GFR) BETWEEN 30-44 ML/MIN/1.73 SQUARE METER AND ALBUMINURIA CREATININE RATIO LESS THAN 30 MG/G (CMS/H*: ICD-10-CM

## 2025-04-15 DIAGNOSIS — I10 PRIMARY HYPERTENSION: ICD-10-CM

## 2025-04-15 DIAGNOSIS — E11.9 TYPE 2 DIABETES MELLITUS WITHOUT COMPLICATION, WITH LONG-TERM CURRENT USE OF INSULIN: ICD-10-CM

## 2025-04-15 DIAGNOSIS — N18.32 STAGE 3B CHRONIC KIDNEY DISEASE: ICD-10-CM

## 2025-04-15 DIAGNOSIS — N18.32 CHRONIC KIDNEY DISEASE (CKD) STAGE G3B/A1, MODERATELY DECREASED GLOMERULAR FILTRATION RATE (GFR) BETWEEN 30-44 ML/MIN/1.73 SQUARE METER AND ALBUMINURIA CREATININE RATIO LESS THAN 30 MG/G (CMS/H*: Primary | ICD-10-CM

## 2025-04-15 DIAGNOSIS — Z00.00 WELL ADULT EXAM: ICD-10-CM

## 2025-04-15 DIAGNOSIS — Z79.4 TYPE 2 DIABETES MELLITUS WITHOUT COMPLICATION, WITH LONG-TERM CURRENT USE OF INSULIN: ICD-10-CM

## 2025-04-15 LAB
ALBUMIN SERPL-MCNC: 4.1 G/DL (ref 3.5–5.2)
ALBUMIN UR-MCNC: 7.6 MG/DL
ALBUMIN/GLOB SERPL: 1.4 G/DL
ALP SERPL-CCNC: 70 U/L (ref 39–117)
ALT SERPL W P-5'-P-CCNC: 20 U/L (ref 1–33)
ANION GAP SERPL CALCULATED.3IONS-SCNC: 10 MMOL/L (ref 5–15)
AST SERPL-CCNC: 29 U/L (ref 1–32)
BACTERIA UR QL AUTO: ABNORMAL /HPF
BACTERIA UR QL AUTO: ABNORMAL /HPF
BASOPHILS # BLD AUTO: 0.02 10*3/MM3 (ref 0–0.2)
BASOPHILS NFR BLD AUTO: 0.4 % (ref 0–1.5)
BILIRUB SERPL-MCNC: 0.7 MG/DL (ref 0–1.2)
BILIRUB UR QL STRIP: NEGATIVE
BILIRUB UR QL STRIP: NEGATIVE
BUN SERPL-MCNC: 11 MG/DL (ref 8–23)
BUN/CREAT SERPL: 6.7 (ref 7–25)
CALCIUM SPEC-SCNC: 9.5 MG/DL (ref 8.6–10.5)
CHLORIDE SERPL-SCNC: 105 MMOL/L (ref 98–107)
CHOLEST SERPL-MCNC: 140 MG/DL (ref 0–200)
CLARITY UR: CLEAR
CLARITY UR: CLEAR
CO2 SERPL-SCNC: 24 MMOL/L (ref 22–29)
COLOR UR: YELLOW
COLOR UR: YELLOW
CREAT SERPL-MCNC: 1.63 MG/DL (ref 0.57–1)
CREAT UR-MCNC: 111.2 MG/DL
CREAT UR-MCNC: 115 MG/DL
DEPRECATED RDW RBC AUTO: 40.1 FL (ref 37–54)
EGFRCR SERPLBLD CKD-EPI 2021: 31.2 ML/MIN/1.73
EOSINOPHIL # BLD AUTO: 0.27 10*3/MM3 (ref 0–0.4)
EOSINOPHIL NFR BLD AUTO: 5 % (ref 0.3–6.2)
ERYTHROCYTE [DISTWIDTH] IN BLOOD BY AUTOMATED COUNT: 12.5 % (ref 12.3–15.4)
GLOBULIN UR ELPH-MCNC: 3 GM/DL
GLUCOSE SERPL-MCNC: 114 MG/DL (ref 65–99)
GLUCOSE UR STRIP-MCNC: NEGATIVE MG/DL
GLUCOSE UR STRIP-MCNC: NEGATIVE MG/DL
HBA1C MFR BLD: 6.5 % (ref 4.8–5.6)
HCT VFR BLD AUTO: 38 % (ref 34–46.6)
HDLC SERPL-MCNC: 48 MG/DL (ref 40–60)
HGB BLD-MCNC: 12.3 G/DL (ref 12–15.9)
HGB UR QL STRIP.AUTO: ABNORMAL
HGB UR QL STRIP.AUTO: ABNORMAL
HOLD SPECIMEN: NORMAL
HYALINE CASTS UR QL AUTO: ABNORMAL /LPF
HYALINE CASTS UR QL AUTO: ABNORMAL /LPF
IMM GRANULOCYTES # BLD AUTO: 0.05 10*3/MM3 (ref 0–0.05)
IMM GRANULOCYTES NFR BLD AUTO: 0.9 % (ref 0–0.5)
KETONES UR QL STRIP: NEGATIVE
KETONES UR QL STRIP: NEGATIVE
LDLC SERPL CALC-MCNC: 64 MG/DL (ref 0–100)
LDLC/HDLC SERPL: 1.24 {RATIO}
LEUKOCYTE ESTERASE UR QL STRIP.AUTO: ABNORMAL
LEUKOCYTE ESTERASE UR QL STRIP.AUTO: ABNORMAL
LYMPHOCYTES # BLD AUTO: 1.7 10*3/MM3 (ref 0.7–3.1)
LYMPHOCYTES NFR BLD AUTO: 31.6 % (ref 19.6–45.3)
MCH RBC QN AUTO: 28.7 PG (ref 26.6–33)
MCHC RBC AUTO-ENTMCNC: 32.4 G/DL (ref 31.5–35.7)
MCV RBC AUTO: 88.6 FL (ref 79–97)
MICROALBUMIN/CREAT UR: 68.3 MG/G (ref 0–29)
MONOCYTES # BLD AUTO: 0.5 10*3/MM3 (ref 0.1–0.9)
MONOCYTES NFR BLD AUTO: 9.3 % (ref 5–12)
NEUTROPHILS NFR BLD AUTO: 2.84 10*3/MM3 (ref 1.7–7)
NEUTROPHILS NFR BLD AUTO: 52.8 % (ref 42.7–76)
NITRITE UR QL STRIP: NEGATIVE
NITRITE UR QL STRIP: NEGATIVE
NRBC BLD AUTO-RTO: 0 /100 WBC (ref 0–0.2)
PH UR STRIP.AUTO: 5.5 [PH] (ref 5–8)
PH UR STRIP.AUTO: 6 [PH] (ref 5–8)
PHOSPHATE SERPL-MCNC: 4 MG/DL (ref 2.5–4.5)
PLATELET # BLD AUTO: 232 10*3/MM3 (ref 140–450)
PMV BLD AUTO: 11.2 FL (ref 6–12)
POTASSIUM SERPL-SCNC: 4.9 MMOL/L (ref 3.5–5.2)
PROT ?TM UR-MCNC: 51.6 MG/DL
PROT SERPL-MCNC: 7.1 G/DL (ref 6–8.5)
PROT UR QL STRIP: ABNORMAL
PROT UR QL STRIP: ABNORMAL
PROT/CREAT UR: 0.45 MG/G{CREAT}
PTH-INTACT SERPL-MCNC: 70 PG/ML (ref 15–65)
RBC # BLD AUTO: 4.29 10*6/MM3 (ref 3.77–5.28)
RBC # UR STRIP: ABNORMAL /HPF
RBC # UR STRIP: ABNORMAL /HPF
REF LAB TEST METHOD: ABNORMAL
REF LAB TEST METHOD: ABNORMAL
SODIUM SERPL-SCNC: 139 MMOL/L (ref 136–145)
SP GR UR STRIP: 1.01 (ref 1–1.03)
SP GR UR STRIP: 1.01 (ref 1–1.03)
SQUAMOUS #/AREA URNS HPF: ABNORMAL /HPF
SQUAMOUS #/AREA URNS HPF: ABNORMAL /HPF
TRIGL SERPL-MCNC: 163 MG/DL (ref 0–150)
TSH SERPL DL<=0.05 MIU/L-ACNC: 3.02 UIU/ML (ref 0.27–4.2)
URATE SERPL-MCNC: 3.8 MG/DL (ref 2.4–5.7)
UROBILINOGEN UR QL STRIP: ABNORMAL
UROBILINOGEN UR QL STRIP: ABNORMAL
VIT B12 BLD-MCNC: 542 PG/ML (ref 211–946)
VLDLC SERPL-MCNC: 28 MG/DL (ref 5–40)
WBC # UR STRIP: ABNORMAL /HPF
WBC # UR STRIP: ABNORMAL /HPF
WBC NRBC COR # BLD AUTO: 5.38 10*3/MM3 (ref 3.4–10.8)

## 2025-04-15 PROCEDURE — 80053 COMPREHEN METABOLIC PANEL: CPT

## 2025-04-15 PROCEDURE — 83036 HEMOGLOBIN GLYCOSYLATED A1C: CPT

## 2025-04-15 PROCEDURE — 85025 COMPLETE CBC W/AUTO DIFF WBC: CPT

## 2025-04-15 PROCEDURE — 84156 ASSAY OF PROTEIN URINE: CPT

## 2025-04-15 PROCEDURE — 82043 UR ALBUMIN QUANTITATIVE: CPT

## 2025-04-15 PROCEDURE — 83970 ASSAY OF PARATHORMONE: CPT

## 2025-04-15 PROCEDURE — 36415 COLL VENOUS BLD VENIPUNCTURE: CPT

## 2025-04-15 PROCEDURE — 80061 LIPID PANEL: CPT

## 2025-04-15 PROCEDURE — 84100 ASSAY OF PHOSPHORUS: CPT

## 2025-04-15 PROCEDURE — 82570 ASSAY OF URINE CREATININE: CPT

## 2025-04-15 PROCEDURE — 82607 VITAMIN B-12: CPT

## 2025-04-15 PROCEDURE — 84550 ASSAY OF BLOOD/URIC ACID: CPT

## 2025-04-15 PROCEDURE — 81001 URINALYSIS AUTO W/SCOPE: CPT

## 2025-04-15 PROCEDURE — 84443 ASSAY THYROID STIM HORMONE: CPT

## 2025-04-18 ENCOUNTER — OFFICE VISIT (OUTPATIENT)
Age: 85
End: 2025-04-18
Payer: MEDICARE

## 2025-04-18 ENCOUNTER — TELEPHONE (OUTPATIENT)
Age: 85
End: 2025-04-18

## 2025-04-18 VITALS
OXYGEN SATURATION: 96 % | HEART RATE: 70 BPM | SYSTOLIC BLOOD PRESSURE: 118 MMHG | WEIGHT: 126.4 LBS | HEIGHT: 62 IN | DIASTOLIC BLOOD PRESSURE: 50 MMHG | BODY MASS INDEX: 23.26 KG/M2

## 2025-04-18 DIAGNOSIS — Z00.00 MEDICARE ANNUAL WELLNESS VISIT, SUBSEQUENT: Primary | ICD-10-CM

## 2025-04-18 DIAGNOSIS — I10 PRIMARY HYPERTENSION: ICD-10-CM

## 2025-04-18 DIAGNOSIS — E55.9 VITAMIN D DEFICIENCY: ICD-10-CM

## 2025-04-18 DIAGNOSIS — E78.2 MIXED HYPERLIPIDEMIA: ICD-10-CM

## 2025-04-18 DIAGNOSIS — E11.9 TYPE 2 DIABETES MELLITUS WITHOUT COMPLICATION, WITH LONG-TERM CURRENT USE OF INSULIN: ICD-10-CM

## 2025-04-18 DIAGNOSIS — I25.9 IHD (ISCHEMIC HEART DISEASE): ICD-10-CM

## 2025-04-18 DIAGNOSIS — N18.32 STAGE 3B CHRONIC KIDNEY DISEASE: ICD-10-CM

## 2025-04-18 DIAGNOSIS — Z79.4 TYPE 2 DIABETES MELLITUS WITHOUT COMPLICATION, WITH LONG-TERM CURRENT USE OF INSULIN: ICD-10-CM

## 2025-04-18 RX ORDER — GLIPIZIDE 5 MG/1
10 TABLET ORAL
Qty: 360 TABLET | Refills: 3 | Status: SHIPPED | OUTPATIENT
Start: 2025-04-18 | End: 2025-04-18

## 2025-04-18 RX ORDER — GLIPIZIDE 5 MG/1
10 TABLET, FILM COATED, EXTENDED RELEASE ORAL 2 TIMES DAILY
Qty: 360 TABLET | Refills: 1 | Status: SHIPPED | OUTPATIENT
Start: 2025-04-18

## 2025-04-18 NOTE — ASSESSMENT & PLAN NOTE
AWV completed 4/24.  Patient remains active and independent.  Had 1 accidental fall in a parking lot recently = foot got caught.  No hospitalizations in the past year.  Does not have advanced directive, however she does have one of her children designated as her medical POA (Dr Turcios).

## 2025-04-18 NOTE — PROGRESS NOTES
Subjective   The ABCs of the Annual Wellness Visit  Medicare Wellness Visit      Lucía Thomas is a 84 y.o. patient who presents for a Medicare Wellness Visit.    The following portions of the patient's history were reviewed and   updated as appropriate: allergies, current medications, past family history, past medical history, past social history, past surgical history, and problem list.    Compared to one year ago, the patient's physical   health is the same.  Compared to one year ago, the patient's mental   health is the same.    Recent Hospitalizations:  She was not admitted to the hospital during the last year.     Current Medical Providers:  Patient Care Team:  Juan Alberto Genao MD as PCP - General (Internal Medicine)  Self, Nina COX MD as Consulting Physician (Endocrinology)  Orestes Rogers MD as Consulting Physician (Nephrology)  JEAN CARLOS Gibson MD as Consulting Physician (Cardiology)    Outpatient Medications Prior to Visit   Medication Sig Dispense Refill    Accu-Chek SmartView test strip 2 (Two) Times a Day. test blood sugar      Aspirin Low Dose 81 MG EC tablet Take 1 tablet by mouth Every Evening.      ezetimibe (ZETIA) 10 MG tablet Take 1 tablet by mouth Daily. 90 tablet 1    glipizide (GLUCOTROL) 10 MG tablet Take 1 tablet by mouth 2 (Two) Times a Day Before Meals. PT takes 5mg BID      losartan (COZAAR) 25 MG tablet Take 1 tablet by mouth Daily.      metoprolol succinate XL (TOPROL-XL) 50 MG 24 hr tablet Take 1.5 tablets by mouth Daily. 135 tablet 1    rosuvastatin (CRESTOR) 20 MG tablet Take 1 tablet by mouth Daily.      Tirzepatide (Mounjaro) 5 MG/0.5ML solution pen-injector pen Inject 0.5 mL under the skin into the appropriate area as directed 1 (One) Time Per Week.      vitamin D (ERGOCALCIFEROL) 1.25 MG (81717 UT) capsule capsule Take 1 capsule by mouth 1 (One) Time Per Week. 12 capsule 1     No facility-administered medications prior to visit.     No opioid medication identified on  "active medication list. I have reviewed chart for other potential  high risk medication/s and harmful drug interactions in the elderly.      Aspirin is on active medication list. Aspirin use is indicated based on review of current medical condition/s. Pros and cons of this therapy have been discussed today. Benefits of this medication outweigh potential harm.  Patient has been encouraged to continue taking this medication.  .      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  ACP discussion was held with the patient during this visit. Patient does not have an advance directive, information provided.            Objective   Vitals:    04/18/25 0938   BP: 118/50  Comment: Manual   Pulse: 70   SpO2: 96%   Weight: 57.3 kg (126 lb 6.4 oz)   Height: 157.5 cm (62\")   PainSc: 0-No pain       Estimated body mass index is 23.12 kg/m² as calculated from the following:    Height as of this encounter: 157.5 cm (62\").    Weight as of this encounter: 57.3 kg (126 lb 6.4 oz).    BMI is within normal parameters. No other follow-up for BMI required.           Does the patient have evidence of cognitive impairment? No  Lab Results   Component Value Date    TRIG 163 (H) 04/15/2025    HDL 48 04/15/2025    LDL 64 04/15/2025    VLDL 28 04/15/2025    HGBA1C 6.50 (H) 04/15/2025                                                                                               Health  Risk Assessment    Smoking Status:  Social History     Tobacco Use   Smoking " Status Never    Passive exposure: Never   Smokeless Tobacco Never     Alcohol Consumption:  Social History     Substance and Sexual Activity   Alcohol Use Never       Fall Risk Screen  STEADI Fall Risk Assessment was completed, and patient is at LOW risk for falls.Assessment completed on:2025    Depression Screening   Little interest or pleasure in doing things? Not at all   Feeling down, depressed, or hopeless? Not at all   PHQ-2 Total Score 0      Health Habits and Functional and Cognitive Screenin/18/2025    10:00 AM   Functional & Cognitive Status   Do you have difficulty preparing food and eating? No   Do you have difficulty bathing yourself, getting dressed or grooming yourself? No   Do you have difficulty using the toilet? No   Do you have difficulty moving around from place to place? No   Do you have trouble with steps or getting out of a bed or a chair? No   Current Diet Diabetic Diet   Do you need help using the phone?  No   Are you deaf or do you have serious difficulty hearing?  No   Do you need help to go to places out of walking distance? No   Do you need help shopping? No   Do you need help preparing meals?  No   Do you need help with housework?  No   Do you need help with laundry? No   Do you need help taking your medications? No   Do you need help managing money? No   Do you ever drive or ride in a car without wearing a seat belt? No   Have you felt unusual stress, anger or loneliness in the last month? No   Do you have difficulty concentrating, remembering or making decisions? No           Age-appropriate Screening Schedule:  Refer to the list below for future screening recommendations based on patient's age, sex and/or medical conditions. Orders for these recommended tests are listed in the plan section. The patient has been provided with a written plan.    Health Maintenance List  Health Maintenance   Topic Date Due    DIABETIC FOOT EXAM  Never done    TDAP/TD VACCINES (1 - Tdap)  Never done    RSV Vaccine - Adults (1 - 1-dose 75+ series) Never done    ANNUAL WELLNESS VISIT  04/24/2025    INFLUENZA VACCINE  07/01/2025    DXA SCAN  07/19/2025    DIABETIC EYE EXAM  08/01/2025    HEMOGLOBIN A1C  10/15/2025    LIPID PANEL  04/15/2026    URINE MICROALBUMIN-CREATININE RATIO (uACR)  04/15/2026    Pneumococcal Vaccine 50+  Completed    ZOSTER VACCINE  Completed    COVID-19 Vaccine  Discontinued                                                                                                                                                CMS Preventative Services Quick Reference  Risk Factors Identified During Encounter  None Identified    The above risks/problems have been discussed with the patient.  Pertinent information has been shared with the patient in the After Visit Summary.  An After Visit Summary and PPPS were made available to the patient.    Follow Up:   Next Medicare Wellness visit to be scheduled in 1 year.     Assessment & Plan  Medicare annual wellness visit, subsequent  AWV completed 4/24.  Patient remains active and independent.  Had 1 accidental fall in a parking lot recently = foot got caught.  No hospitalizations in the past year.  Does not have advanced directive, however she does have one of her children designated as her medical POA (Dr Turcios).              Follow Up:   No follow-ups on file.

## 2025-04-18 NOTE — ASSESSMENT & PLAN NOTE
Patient with no ischemic symptoms as of her 4/25 OV.  She is followed closely by Dr. Gibson and is maintained on aspirin and beta-blocker for antianginal benefit, addressing risk factors as well.

## 2025-04-18 NOTE — TELEPHONE ENCOUNTER
Pharmacist needs you to verify what dose of glipizide you want the PT taking due to there being two different prescriptions on file.

## 2025-04-18 NOTE — TELEPHONE ENCOUNTER
Please call let the pharmacy know that what we had on her record was wrong, so they need to cancel the short acting glipizide that we sent initially, and go with the glipizide XL that I just sent over.  The patient is to take 2 of those twice a day, thanks.

## 2025-04-18 NOTE — ASSESSMENT & PLAN NOTE
Blood pressure is well-controlled and her pulse is around 70 as of her 4/25 OV.  She is on low-dose ARB as well as low-dose metoprolol, stable to continue same.

## 2025-04-18 NOTE — ASSESSMENT & PLAN NOTE
His A1c is down sharply from 8.3 to 6.5 as of her 4/25 OV.  She was started on low-dose Mounjaro when we saw her last and is also on full dose glipizide.  This is certainly an excellent response, patient stable to continue same, she has follow-up with Dr. Peralta in May.

## 2025-04-18 NOTE — ASSESSMENT & PLAN NOTE
LDL of 64 is at goal for secondary prevention, she is on moderate dose Crestor and Zetia, so certainly appropriate to continue same.

## 2025-04-18 NOTE — ASSESSMENT & PLAN NOTE
Patient's GFR is stable at 31 as of her 4/25 OV.  Her electrolytes are fine, no acidosis.  She is on low-dose ARB and follows with nephrology.

## 2025-08-01 ENCOUNTER — LAB (OUTPATIENT)
Dept: LAB | Facility: HOSPITAL | Age: 85
End: 2025-08-01
Payer: MEDICARE

## 2025-08-01 ENCOUNTER — TRANSCRIBE ORDERS (OUTPATIENT)
Dept: FAMILY MEDICINE CLINIC | Facility: CLINIC | Age: 85
End: 2025-08-01
Payer: MEDICARE

## 2025-08-01 ENCOUNTER — APPOINTMENT (OUTPATIENT)
Dept: LAB | Facility: HOSPITAL | Age: 85
End: 2025-08-01
Payer: MEDICARE

## 2025-08-01 DIAGNOSIS — E11.21 DIABETIC GLOMERULOPATHY: ICD-10-CM

## 2025-08-01 DIAGNOSIS — E11.21 DIABETIC GLOMERULOPATHY: Primary | ICD-10-CM

## 2025-08-01 LAB
ALBUMIN SERPL-MCNC: 4 G/DL (ref 3.5–5.2)
ALBUMIN/GLOB SERPL: 1.4 G/DL
ALP SERPL-CCNC: 68 U/L (ref 39–117)
ALT SERPL W P-5'-P-CCNC: 18 U/L (ref 1–33)
ANION GAP SERPL CALCULATED.3IONS-SCNC: 11 MMOL/L (ref 5–15)
AST SERPL-CCNC: 29 U/L (ref 1–32)
BILIRUB SERPL-MCNC: 0.5 MG/DL (ref 0–1.2)
BUN SERPL-MCNC: 14 MG/DL (ref 8–23)
BUN/CREAT SERPL: 8 (ref 7–25)
CALCIUM SPEC-SCNC: 9.1 MG/DL (ref 8.6–10.5)
CHLORIDE SERPL-SCNC: 102 MMOL/L (ref 98–107)
CO2 SERPL-SCNC: 23 MMOL/L (ref 22–29)
CREAT SERPL-MCNC: 1.75 MG/DL (ref 0.57–1)
EGFRCR SERPLBLD CKD-EPI 2021: 28.6 ML/MIN/1.73
GLOBULIN UR ELPH-MCNC: 2.9 GM/DL
GLUCOSE SERPL-MCNC: 161 MG/DL (ref 65–99)
HBA1C MFR BLD: 7.7 % (ref 4.8–5.6)
POTASSIUM SERPL-SCNC: 4.9 MMOL/L (ref 3.5–5.2)
PROT SERPL-MCNC: 6.9 G/DL (ref 6–8.5)
SODIUM SERPL-SCNC: 136 MMOL/L (ref 136–145)

## 2025-08-01 PROCEDURE — 36415 COLL VENOUS BLD VENIPUNCTURE: CPT

## 2025-08-01 PROCEDURE — 80053 COMPREHEN METABOLIC PANEL: CPT

## 2025-08-01 PROCEDURE — 83036 HEMOGLOBIN GLYCOSYLATED A1C: CPT

## 2025-08-25 RX ORDER — METOPROLOL SUCCINATE 50 MG/1
75 TABLET, EXTENDED RELEASE ORAL DAILY
Qty: 135 TABLET | Refills: 1 | Status: SHIPPED | OUTPATIENT
Start: 2025-08-25

## (undated) DEVICE — LINER SURG CANSTR SXN S/RIGD 1500CC

## (undated) DEVICE — SNAR POLYP CAPTIFLEX XS/OVL 11X2.4MM 240CM 1P/U

## (undated) DEVICE — SOLIDIFIER LIQLOC PLS 1500CC BT

## (undated) DEVICE — THE SINGLE USE ETRAP – POLYP TRAP IS USED FOR SUCTION RETRIEVAL OF ENDOSCOPICALLY REMOVED POLYPS.: Brand: ETRAP

## (undated) DEVICE — SOL IRRG H2O PL/BG 1000ML STRL

## (undated) DEVICE — Device: Brand: DEFENDO AIR/WATER/SUCTION AND BIOPSY VALVE

## (undated) DEVICE — CONN JET HYDRA H20 AUXILIARY DISP

## (undated) DEVICE — Device